# Patient Record
Sex: MALE | Race: WHITE | Employment: FULL TIME | URBAN - METROPOLITAN AREA
[De-identification: names, ages, dates, MRNs, and addresses within clinical notes are randomized per-mention and may not be internally consistent; named-entity substitution may affect disease eponyms.]

---

## 2017-05-17 ENCOUNTER — OFFICE VISIT (OUTPATIENT)
Dept: FAMILY MEDICINE | Facility: CLINIC | Age: 57
End: 2017-05-17
Payer: COMMERCIAL

## 2017-05-17 ENCOUNTER — TELEPHONE (OUTPATIENT)
Dept: FAMILY MEDICINE | Facility: CLINIC | Age: 57
End: 2017-05-17

## 2017-05-17 VITALS
TEMPERATURE: 98.8 F | WEIGHT: 226 LBS | OXYGEN SATURATION: 96 % | HEIGHT: 69 IN | HEART RATE: 83 BPM | BODY MASS INDEX: 33.47 KG/M2 | SYSTOLIC BLOOD PRESSURE: 196 MMHG | DIASTOLIC BLOOD PRESSURE: 108 MMHG

## 2017-05-17 DIAGNOSIS — H60.332 ACUTE SWIMMER'S EAR OF LEFT SIDE: ICD-10-CM

## 2017-05-17 DIAGNOSIS — Z13.6 CARDIOVASCULAR SCREENING; LDL GOAL LESS THAN 130: ICD-10-CM

## 2017-05-17 DIAGNOSIS — Z51.81 MEDICATION MONITORING ENCOUNTER: ICD-10-CM

## 2017-05-17 DIAGNOSIS — I10 HYPERTENSION GOAL BP (BLOOD PRESSURE) < 140/90: Primary | ICD-10-CM

## 2017-05-17 PROCEDURE — 99214 OFFICE O/P EST MOD 30 MIN: CPT | Performed by: FAMILY MEDICINE

## 2017-05-17 PROCEDURE — 82043 UR ALBUMIN QUANTITATIVE: CPT | Performed by: FAMILY MEDICINE

## 2017-05-17 PROCEDURE — 80048 BASIC METABOLIC PNL TOTAL CA: CPT | Performed by: FAMILY MEDICINE

## 2017-05-17 PROCEDURE — 36415 COLL VENOUS BLD VENIPUNCTURE: CPT | Performed by: FAMILY MEDICINE

## 2017-05-17 RX ORDER — NEOMYCIN SULFATE, POLYMYXIN B SULFATE AND HYDROCORTISONE 10; 3.5; 1 MG/ML; MG/ML; [USP'U]/ML
4 SUSPENSION/ DROPS AURICULAR (OTIC) 4 TIMES DAILY
Qty: 10 ML | Refills: 0 | Status: SHIPPED | OUTPATIENT
Start: 2017-05-17 | End: 2017-08-16

## 2017-05-17 RX ORDER — LOSARTAN POTASSIUM AND HYDROCHLOROTHIAZIDE 12.5; 5 MG/1; MG/1
1 TABLET ORAL DAILY
Qty: 30 TABLET | Refills: 1 | Status: SHIPPED | OUTPATIENT
Start: 2017-05-17 | End: 2017-07-17

## 2017-05-17 NOTE — TELEPHONE ENCOUNTER
Concern - high bP fluid running out of ears     Onset: sunday    Description:   207/118    Intensity: moderate    Progression of Symptoms:  worsening    Accompanying Signs & Symptoms:  Headache for 1 week no chest pain or SOB       Previous history of similar problem:   See note    Precipitating factors:   Worsened by: nothing    Alleviating factors:  Improved by: nothing        Therapies Tried and outcome: Message handled by Nurse Triage with [facility] - provider name: REID Downs         Appt made.        Advised to go to ER if chest pain and stroke symptoms that are reviewed with patients.    Patricia Ellis RN    [facility]

## 2017-05-17 NOTE — PROGRESS NOTES
SUBJECTIVE:                                                    Yannick Jean is a 56 year old male who presents to clinic today for the following health issues:    Hypertension Follow-up      Outpatient blood pressures was involved in a clinical study - He had his blood pressure checked at [facility] this morning.    Low Salt Diet: not monitoring salt     Hypertension -- The patient took his blood pressure this morning and it was 210/120. The patient has previously been prescribed lisinopril-hydrochlorothiazide in 2015, but he has not taken the medication. Lisinopril-hydrochlorothiazide caused him to have a persistent cough. He denies edema, chest pain, vision changes, and headache.    BP Readings from Last 3 Encounters:   05/17/17 (!) 196/108   03/30/16 (!) 160/99   10/28/15 139/82     Ear Problem -- The patient has been having bloody fluid leaking from both ears. He states that he occasionally swims in pools. He has a history of left ear tympanostomy.    Problem list and histories reviewed & adjusted, as indicated.  Additional history: as documented      Reviewed and updated as needed this visit by clinical staff  Tobacco  Allergies  Meds  Med Hx  Surg Hx  Fam Hx  Soc Hx      Reviewed and updated as needed this visit by Provider       Wt Readings from Last 4 Encounters:   05/17/17 226 lb (102.5 kg)   03/30/16 227 lb 9.6 oz (103.2 kg)   10/28/15 218 lb 6.4 oz (99.1 kg)   09/23/15 219 lb (99.3 kg)     Health Maintenance    Health Maintenance Due   Topic Date Due     MICROALBUMIN Q1 YEAR( NO INBASKET)  07/18/1959     FIT Q1 YR (NO INBASKET)  07/18/1968     ADVANCE DIRECTIVE PLANNING Q5 YRS (NO INBASKET)  07/18/1976     HEPATITIS C SCREENING  07/18/1976     LIPID MONITORING Q1 YEAR( NO INBASKET)  06/16/2010     PSA Q1 YR  04/01/2015     BMP Q1 YR (NO INBASKET)  09/23/2016       Current Problem List    Patient Active Problem List   Diagnosis     ED (erectile dysfunction)     Hyperplastic colonic polyp      Hypertension goal BP (blood pressure) < 140/90     CARDIOVASCULAR SCREENING; LDL GOAL LESS THAN 130     Cervical pain     S/P cervical spinal fusion       Past Medical History    Past Medical History:   Diagnosis Date     CARDIOVASCULAR SCREENING; LDL GOAL LESS THAN 130      Cervical stenosis of spine 5/15    central and foraminal - moderate to severe     Colon polyps, hyperplastic 6/09    x 4 - due 2019     ED (erectile dysfunction)     dr atkinson     Hypertension goal BP (blood pressure) < 140/90      Mixed hearing loss     Dr Pretty     Tobacco use disorder     quit 1/09       Past Surgical History    Past Surgical History:   Procedure Laterality Date     COLONOSCOPY  6/09    hyperplastic - due 10 yrs     FUSION CERVICAL ANTERIOR THREE+ LEVELS N/A 7/30/2015    C3-6 FUSION CERVICAL ANTERIOR - Dr Morris     PE TUBES      PE tubes x 5     STRESS ECHO (METRO)  1/08    normal     SURGICAL HISTORY OF -   12/08    Rt Ear cholesteoma/tympanoplasty dr pretty     SURGICAL HISTORY OF -   7/09    dr patton - large lt foot ganglion cyst     TONSILLECTOMY & ADENOIDECTOMY  1963,1989       Current Medications    Current Outpatient Prescriptions   Medication Sig Dispense Refill     losartan-hydrochlorothiazide (HYZAAR) 50-12.5 MG per tablet Take 1 tablet by mouth daily 30 tablet 1     neomycin-polymyxin-hydrocortisone (CORTISPORIN) 3.5-07634-3 otic suspension Place 4 drops in ear(s) 4 times daily 10 mL 0       Allergies    Allergies   Allergen Reactions     Penicillins        Immunizations    Immunization History   Administered Date(s) Administered     Influenza (H1N1) 01/12/2010     Influenza (IIV3) 11/05/2008, 11/03/2009, 12/10/2010     TD (ADULT, 7+) 01/01/2005     TDAP Vaccine (Boostrix) 07/29/2015       Family History    Family History   Problem Relation Age of Onset     Cardiovascular Father      First MI at 61, dies of MI at age 65     Respiratory Mother      Emphysema, COPD     Cardiovascular Maternal Grandmother       " age approx 50 of MI       Social History    Social History     Social History     Marital status:      Spouse name: aguila     Number of children: 2     Years of education: 12     Occupational History      None      Social History Main Topics     Smoking status: Former Smoker     Packs/day: 2.00     Years: 32.00     Types: Cigarettes     Quit date: 2009     Smokeless tobacco: Never Used     Alcohol use No     Drug use: No     Sexual activity: Yes     Partners: Female     Other Topics Concern     Caffeine Concern Yes     1-2 cups qd, 5 cans daily     Exercise No     Seat Belt No     Social History Narrative       All above reviewed and updated, all stable unless otherwise noted    Recent labs reviewed    ROS:  Constitutional, HEENT, cardiovascular, pulmonary, GI, , musculoskeletal, neuro, skin, endocrine and psych systems are negative, except as otherwise noted.    OBJECTIVE:                                                    BP (!) 196/108  Pulse 83  Temp 98.8  F (37.1  C) (Oral)  Ht 5' 9\" (1.753 m)  Wt 226 lb (102.5 kg)  SpO2 96%  BMI 33.37 kg/m2  Body mass index is 33.37 kg/(m^2).  GENERAL: healthy, alert and no distress  EYES: Eyes grossly normal to inspection, extraocular movements - intact, and PERRL  HENT: ear canals- normal; TMs- normal; Nose- normal; Mouth- no ulcers, no lesions  NECK: no tenderness, no adenopathy, no asymmetry, no masses, no stiffness; thyroid- normal to palpation  RESP: lungs clear to auscultation - no rales, no rhonchi, no wheezes  CV: regular rates and rhythm, normal S1 S2, no S3 or S4 and no murmur, no click or rub -  ABDOMEN: soft, no tenderness, no  hepatosplenomegaly, no masses, normal bowel sounds  MS: extremities- no gross deformities noted, no edema  SKIN: no suspicious lesions, no rashes  NEURO: strength and tone- normal, sensory exam- grossly normal, mentation- intact, speech- normal, reflexes- symmetric  BACK: no CVA tenderness, no paralumbar " tenderness  PSYCH: Alert and oriented times 3; speech- coherent , normal rate and volume; able to articulate logical thoughts, able to abstract reason, no tangential thoughts, no hallucinations or delusions, affect- normal  LYMPHATICS: ant. cervical- normal, post. cervical- normal, axillary- normal, supraclavicular- normal, inguinal- normal    DIAGNOSTICS/PROCEDURES:                                                      Pending     ASSESSMENT/PLAN:                                                        ICD-10-CM    1. Hypertension goal BP (blood pressure) < 140/90 I10 losartan-hydrochlorothiazide (HYZAAR) 50-12.5 MG per tablet   2. CARDIOVASCULAR SCREENING; LDL GOAL LESS THAN 130 Z13.6    3. Acute swimmer's ear of left side H60.332 neomycin-polymyxin-hydrocortisone (CORTISPORIN) 3.5-96061-2 otic suspension   4. Medication monitoring encounter Z51.81 Basic metabolic panel  (Ca, Cl, CO2, Creat, Gluc, K, Na, BUN)     Albumin Random Urine Quantitative     Hypertension - Start taking 12.5 MG losartan-hydrochlorothiazide once daily, labs. Followup in one week for blood pressure recheck.    Acute Swimmer's Ear - Place 4 drops of cortisporin four times daily    Discussed treatment/modality options, including risk and benefits, he desires new medication(s). All diagnosis above reviewed and noted above, otherwise stable.  See [facility] orders for further details.  Follow up in 7 day(s) and as needed.    Health Maintenance Due   Topic Date Due     MICROALBUMIN Q1 YEAR( NO INBASKET)  07/18/1959     FIT Q1 YR (NO INBASKET)  07/18/1968     ADVANCE DIRECTIVE PLANNING Q5 YRS (NO INBASKET)  07/18/1976     HEPATITIS C SCREENING  07/18/1976     LIPID MONITORING Q1 YEAR( NO INBASKET)  06/16/2010     PSA Q1 YR  04/01/2015     BMP Q1 YR (NO INBASKET)  09/23/2016       Follow up with Provider - 1 week for blood pressure recheck     This document serves as a record of the services and decisions personally performed and made by Maxwell aBck  MD. It was created on his behalf by Ro Hatch, a trained medical scribe. The creation of this document is based on the provider's statements to the medical scribe.  oR Hatch 2:42 PM 5/17/2017         Maxwell Back MD [address]  [address] [phone] Fax

## 2017-05-17 NOTE — MR AVS SNAPSHOT
After Visit Summary   5/17/2017    Yannick Jean    MRN: [id number]           Patient Information     Date Of Birth          1960        Visit Information        Provider Department      5/17/2017 2:20 PM Maxwell Back MD [facility]        Today's Diagnoses     Hypertension goal BP (blood pressure) < 140/90    -  1    CARDIOVASCULAR SCREENING; LDL GOAL LESS THAN 130        Acute swimmer's ear of left side        Medication monitoring encounter          Care Instructions    Hypertension - Start taking 12.5 MG losartan-hydrochlorothiazide once daily. Followup in one week for blood pressure recheck.    [facility]                        To reach your care team during and after hours:   [phone]  To reach our pharmacy:        [phone]    Clinic Hours                        Our clinic hours are:    Monday   7:30 am to 7:00 pm                  Tuesday through Friday 7:30 am to 5:00 pm                             Saturday   8:00 am to 12:00 pm      Sunday   Closed      Pharmacy Hours                        Our pharmacy hours are:    Monday   8:30 am to 7:00 pm       Tuesday to Friday  8:30 am to 6:00 pm                       Saturday    9:00 am to 1:00 pm              Sunday    Closed              There is also information available at our web site:  [email or web address]    If your provider ordered any lab tests and you do not receive the results within 10 business days, please call the clinic.    If you need a medication refill please contact your pharmacy.  Please allow 2-3 business days for your refill to be completed.    Our clinic offers telephone visits and e visits.  Please ask one of your team members to explain more.      Use [employer] (secure email communication and access to your chart) to send your primary care provider a message or make an appointment. Ask someone on your Team how to sign up for [employer].  Immunizations                   "    Immunization History   Administered Date(s) Administered     Influenza (H1N1) 01/12/2010     Influenza (IIV3) 11/05/2008, 11/03/2009, 12/10/2010     TD (ADULT, 7+) 01/01/2005     TDAP Vaccine (Boostrix) 07/29/2015        Health Maintenance                         Health Maintenance Due   Topic Date Due     Microalbumin Lab - yearly  07/18/1959     Colon Cancer Screening - FIT Test - yearly  07/18/1968     Discuss Advance Directive Planning  07/18/1976     Hepatitis C Screening  07/18/1976     Cholesterol Lab - yearly  06/16/2010     Prostate Test (PSA) - yearly  04/01/2015     Basic Metabolic Lab - yearly  09/23/2016             Follow-ups after your visit        Who to contact     If you have questions or need follow up information about today's clinic visit or your schedule please contact [facility] directly at [phone].  Normal or non-critical lab and imaging results will be communicated to you by MyChart, letter or phone within 4 business days after the clinic has received the results. If you do not hear from us within 7 days, please contact the clinic through [email or web address] or phone. If you have a critical or abnormal lab result, we will notify you by phone as soon as possible.  Submit refill requests through [employer] or call your pharmacy and they will forward the refill request to us. Please allow 3 business days for your refill to be completed.          Additional Information About Your Visit        MyChart Information     [employer] lets you send messages to your doctor, view your test results, renew your prescriptions, schedule appointments and more. To sign up, go to [email or web address] . Click on \"Log in\" on the left side of the screen, which will take you to the Welcome page. Then click on \"Sign up Now\" on the right side of the page.     You will be asked to enter the access code listed below, as well as some personal information. Please follow the directions to create your username " "and password.     Your access code is: [email or web address]  Expires: 8/15/2017  3:03 PM     Your access code will  in 90 days. If you need help or a new code, please call your [facility] clinic or [phone].        Care EveryWhere ID     This is your Care EveryWhere ID. This could be used by other organizations to access your [facility] medical records  [id number]        Your Vitals Were     Pulse Temperature Height Pulse Oximetry BMI (Body Mass Index)       83 98.8  F (37.1  C) (Oral) 5' 9\" (1.753 m) 96% 33.37 kg/m2        Blood Pressure from Last 3 Encounters:   17 (!) 196/108   16 (!) 160/99   10/28/15 139/82    Weight from Last 3 Encounters:   17 226 lb (102.5 kg)   16 227 lb 9.6 oz (103.2 kg)   10/28/15 218 lb 6.4 oz (99.1 kg)              We Performed the Following     Albumin Random Urine Quantitative     Basic metabolic panel  (Ca, Cl, CO2, Creat, Gluc, K, Na, BUN)          Today's Medication Changes          These changes are accurate as of: 17  3:03 PM.  If you have any questions, ask your nurse or doctor.               Start taking these medicines.        Dose/Directions    losartan-hydrochlorothiazide 50-12.5 MG per tablet   Commonly known as:  HYZAAR   Used for:  Hypertension goal BP (blood pressure) < 140/90   Started by:  Mxawell Back MD        Dose:  1 tablet   Take 1 tablet by mouth daily   Quantity:  30 tablet   Refills:  1       neomycin-polymyxin-hydrocortisone 3.5-83879-7 otic suspension   Commonly known as:  CORTISPORIN   Used for:  Acute swimmer's ear of left side   Started by:  Maxwell Back MD        Dose:  4 drop   Place 4 drops in ear(s) 4 times daily   Quantity:  10 mL   Refills:  0            Where to get your medicines      These medications were sent to [facility] [address] [facility]  [address] [place] [address]     Phone:  [phone]     losartan-hydrochlorothiazide 50-12.5 MG per tablet    " neomycin-polymyxin-hydrocortisone 3.5-47333-2 otic suspension                Primary Care Provider Office Phone # Fax #    Maxwell Back [phone]       [facility] [address] [place] [address]        Thank you!     Thank you for choosing [facility]  for your care. Our goal is always to provide you with excellent care. Hearing back from our patients is one way we can continue to improve our services. Please take a few minutes to complete the written survey that you may receive in the mail after your visit with us. Thank you!             Your Updated Medication List - Protect others around you: Learn how to safely use, store and throw away your medicines at www.disposemymeds.org.          This list is accurate as of: 5/17/17  3:03 PM.  Always use your most recent med list.                   Brand Name Dispense Instructions for use    losartan-hydrochlorothiazide 50-12.5 MG per tablet    HYZAAR    30 tablet    Take 1 tablet by mouth daily       neomycin-polymyxin-hydrocortisone 3.5-65339-0 otic suspension    CORTISPORIN    10 mL    Place 4 drops in ear(s) 4 times daily

## 2017-05-17 NOTE — NURSING NOTE
"Chief Complaint   Patient presents with     Hypertension       Initial BP (!) 196/108  Pulse 83  Temp 98.8  F (37.1  C) (Oral)  Ht 5' 9\" (1.753 m)  Wt 226 lb (102.5 kg)  SpO2 96%  BMI 33.37 kg/m2 Estimated body mass index is 33.37 kg/(m^2) as calculated from the following:    Height as of this encounter: 5' 9\" (1.753 m).    Weight as of this encounter: 226 lb (102.5 kg)..  BP completed using cuff size: brigette Mercedes MA  "

## 2017-05-18 LAB
ANION GAP SERPL CALCULATED.3IONS-SCNC: 8 MMOL/L (ref 3–14)
BUN SERPL-MCNC: 19 MG/DL (ref 7–30)
CALCIUM SERPL-MCNC: 8.5 MG/DL (ref 8.5–10.1)
CHLORIDE SERPL-SCNC: 108 MMOL/L (ref 94–109)
CO2 SERPL-SCNC: 26 MMOL/L (ref 20–32)
CREAT SERPL-MCNC: 1 MG/DL (ref 0.66–1.25)
CREAT UR-MCNC: 175 MG/DL
GFR SERPL CREATININE-BSD FRML MDRD: 77 ML/MIN/1.7M2
GLUCOSE SERPL-MCNC: 144 MG/DL (ref 70–99)
MICROALBUMIN UR-MCNC: 228 MG/L
MICROALBUMIN/CREAT UR: 130.29 MG/G CR (ref 0–17)
POTASSIUM SERPL-SCNC: 4.2 MMOL/L (ref 3.4–5.3)
SODIUM SERPL-SCNC: 142 MMOL/L (ref 133–144)

## 2017-05-24 ENCOUNTER — OFFICE VISIT (OUTPATIENT)
Dept: FAMILY MEDICINE | Facility: CLINIC | Age: 57
End: 2017-05-24
Payer: COMMERCIAL

## 2017-05-24 VITALS
DIASTOLIC BLOOD PRESSURE: 86 MMHG | SYSTOLIC BLOOD PRESSURE: 132 MMHG | WEIGHT: 223 LBS | BODY MASS INDEX: 33.03 KG/M2 | TEMPERATURE: 98.1 F | HEIGHT: 69 IN | OXYGEN SATURATION: 91 % | HEART RATE: 88 BPM

## 2017-05-24 DIAGNOSIS — Z51.81 MEDICATION MONITORING ENCOUNTER: ICD-10-CM

## 2017-05-24 DIAGNOSIS — R73.09 ELEVATED GLUCOSE: ICD-10-CM

## 2017-05-24 DIAGNOSIS — I10 HYPERTENSION GOAL BP (BLOOD PRESSURE) < 140/90: Primary | ICD-10-CM

## 2017-05-24 DIAGNOSIS — R80.9 PROTEINURIA: ICD-10-CM

## 2017-05-24 DIAGNOSIS — Z13.6 CARDIOVASCULAR SCREENING; LDL GOAL LESS THAN 130: ICD-10-CM

## 2017-05-24 LAB — HBA1C MFR BLD: 6.1 % (ref 4.3–6)

## 2017-05-24 PROCEDURE — 80048 BASIC METABOLIC PNL TOTAL CA: CPT | Performed by: FAMILY MEDICINE

## 2017-05-24 PROCEDURE — 36415 COLL VENOUS BLD VENIPUNCTURE: CPT | Performed by: FAMILY MEDICINE

## 2017-05-24 PROCEDURE — 99214 OFFICE O/P EST MOD 30 MIN: CPT | Performed by: FAMILY MEDICINE

## 2017-05-24 PROCEDURE — 83036 HEMOGLOBIN GLYCOSYLATED A1C: CPT | Performed by: FAMILY MEDICINE

## 2017-05-24 NOTE — PATIENT INSTRUCTIONS
Continue current medications    Follow up regarding lab tests today    Follow up for fasting labs at next visit    Follow up for recheck in 2-3 weeks    [facility]                        To reach your care team during and after hours:   [phone]  To reach our pharmacy:        [phone]    Clinic Hours                        Our clinic hours are:    Monday   7:30 am to 7:00 pm                  Tuesday through Friday 7:30 am to 5:00 pm                             Saturday   8:00 am to 12:00 pm      Sunday   Closed      Pharmacy Hours                        Our pharmacy hours are:    Monday   8:30 am to 7:00 pm       Tuesday to Friday  8:30 am to 6:00 pm                       Saturday    9:00 am to 1:00 pm              Sunday    Closed              There is also information available at our web site:  [email or web address]    If your provider ordered any lab tests and you do not receive the results within 10 business days, please call the clinic.    If you need a medication refill please contact your pharmacy.  Please allow 2-3 business days for your refill to be completed.    Our clinic offers telephone visits and e visits.  Please ask one of your team members to explain more.      Use [employer] (secure email communication and access to your chart) to send your primary care provider a message or make an appointment. Ask someone on your Team how to sign up for [employer].  Immunizations                      Immunization History   Administered Date(s) Administered     Influenza (H1N1) 01/12/2010     Influenza (IIV3) 11/05/2008, 11/03/2009, 12/10/2010     TD (ADULT, 7+) 01/01/2005     TDAP Vaccine (Boostrix) 07/29/2015        Health Maintenance                         Health Maintenance Due   Topic Date Due     Discuss Advance Directive Planning  07/18/1976     Hepatitis C Screening  07/18/1976     Cholesterol Lab - yearly  06/16/2010     Prostate Test (PSA) - yearly  04/01/2015

## 2017-05-24 NOTE — LETTER
[facility]  [address] [place] [address], [place] [address]                  [phone]   May 25, 2017    Yannick Jean  [address] [facility] [address] [facility] [address]      Dear Yannick,    Here is a summary of your recent test results:    Labs are overall quite good, glucose is normal, average glucose (A1c) is in the prediabetes range.     We advise:     Continue current cares.   Balanced low cholesterol diet.   Regular exercise.   Weight loss.   Follow up in the next few weeks as discussed.     Your test results are enclosed.      Please contact me if you have any questions.      Please call us at [phone] (or use [employer]) to address the above recommendations.            Thank you very much for trusting [facility]..     Healthy regards,        Maxwell Back M.D.        Results for orders placed or performed in visit on 05/24/17   Basic metabolic panel  (Ca, Cl, CO2, Creat, Gluc, K, Na, BUN)   Result Value Ref Range    Sodium 141 133 - 144 mmol/L    Potassium 4.5 3.4 - 5.3 mmol/L    Chloride 106 94 - 109 mmol/L    Carbon Dioxide 26 20 - 32 mmol/L    Anion Gap 9 3 - 14 mmol/L    Glucose 85 70 - 99 mg/dL    Urea Nitrogen 21 7 - 30 mg/dL    Creatinine 0.96 0.66 - 1.25 mg/dL    GFR Estimate 80 >60 mL/min/1.7m2    GFR Estimate If Black >90   GFR Calc   >60 mL/min/1.7m2    Calcium 9.3 8.5 - 10.1 mg/dL   Hemoglobin A1c   Result Value Ref Range    Hemoglobin A1C 6.1 (H) 4.3 - 6.0 %

## 2017-05-24 NOTE — PROGRESS NOTES
SUBJECTIVE:                                                    Yannick Jean is a 56 year old male who presents to clinic today for the following health issues:    Hypertension Follow-up      Outpatient blood pressures are being checked at work.  Results are 159/98 this morning was 171/111  Best was 144/97    Low Salt Diet: no added salt     Frankie was started on Losartan-HCTZ 50-12.5 mg on 5/17/17, since then his symptoms have improved and he has been taking outside BP's, with the lowest being 140's/80's. Denied any adverse chest pain, dizziness, etc. Proteinuria and elevated glucose noted.    Lab Results   Component Value Date    CR 1.00 05/17/2017     BP Readings from Last 3 Encounters:   05/24/17 132/86   05/17/17 (!) 196/108   03/30/16 (!) 160/99     Note 5/17/17 --   Hypertension -- The patient took his blood pressure this morning and it was 210/120. The patient has previously been prescribed lisinopril-hydrochlorothiazide in 2015, but he has not taken the medication. Lisinopril-hydrochlorothiazide caused him to have a persistent cough. He denies edema, chest pain, vision changes, and headache.       Problem list and histories reviewed & adjusted, as indicated.  Additional history: as documented    Reviewed and updated as needed this visit by clinical staff  Tobacco  Allergies  Meds  Med Hx  Surg Hx  Fam Hx  Soc Hx      Reviewed and updated as needed this visit by Provider         Wt Readings from Last 4 Encounters:   05/24/17 223 lb (101.2 kg)   05/17/17 226 lb (102.5 kg)   03/30/16 227 lb 9.6 oz (103.2 kg)   10/28/15 218 lb 6.4 oz (99.1 kg)       Health Maintenance    Health Maintenance Due   Topic Date Due     ADVANCE DIRECTIVE PLANNING Q5 YRS  07/18/1976     HEPATITIS C SCREENING  07/18/1976     LIPID MONITORING Q1 YEAR  06/16/2010     PSA Q1 YR  04/01/2015       Current Problem List    Patient Active Problem List   Diagnosis     ED (erectile dysfunction)     Hyperplastic colonic polyp      Hypertension goal BP (blood pressure) < 140/90     CARDIOVASCULAR SCREENING; LDL GOAL LESS THAN 130     Cervical pain     S/P cervical spinal fusion       Past Medical History    Past Medical History:   Diagnosis Date     CARDIOVASCULAR SCREENING; LDL GOAL LESS THAN 130      Cervical stenosis of spine 5/15    central and foraminal - moderate to severe     Colon polyps, hyperplastic 6/09    x 4 - due 2019     ED (erectile dysfunction)     dr atkinson     Hypertension goal BP (blood pressure) < 140/90      Mixed hearing loss     Dr Pretty     Tobacco use disorder     quit 1/09       Past Surgical History    Past Surgical History:   Procedure Laterality Date     COLONOSCOPY  6/09    hyperplastic - due 10 yrs     FUSION CERVICAL ANTERIOR THREE+ LEVELS N/A 7/30/2015    C3-6 FUSION CERVICAL ANTERIOR - Dr Morris     PE TUBES      PE tubes x 5     STRESS ECHO (METRO)  1/08    normal     SURGICAL HISTORY OF -   12/08    Rt Ear cholesteoma/tympanoplasty dr pretty     SURGICAL HISTORY OF -   7/09    dr patton - large lt foot ganglion cyst     TONSILLECTOMY & ADENOIDECTOMY  1963,1989       Current Medications    Current Outpatient Prescriptions   Medication Sig Dispense Refill     losartan-hydrochlorothiazide (HYZAAR) 50-12.5 MG per tablet Take 1 tablet by mouth daily 30 tablet 1     neomycin-polymyxin-hydrocortisone (CORTISPORIN) 3.5-82002-2 otic suspension Place 4 drops in ear(s) 4 times daily 10 mL 0       Allergies    Allergies   Allergen Reactions     Penicillins        Immunizations    Immunization History   Administered Date(s) Administered     Influenza (H1N1) 01/12/2010     Influenza (IIV3) 11/05/2008, 11/03/2009, 12/10/2010     TD (ADULT, 7+) 01/01/2005     TDAP Vaccine (Boostrix) 07/29/2015       Family History    Family History   Problem Relation Age of Onset     Cardiovascular Father      First MI at 61, dies of MI at age 65     Respiratory Mother      Emphysema, COPD     Cardiovascular Maternal Grandmother       " age approx 50 of MI       Social History    Social History     Social History     Marital status:      Spouse name: aguila     Number of children: 2     Years of education: 12     Occupational History      None      Social History Main Topics     Smoking status: Former Smoker     Packs/day: 2.00     Years: 32.00     Types: Cigarettes     Quit date: 2009     Smokeless tobacco: Never Used     Alcohol use No     Drug use: No     Sexual activity: Yes     Partners: Female     Other Topics Concern     Caffeine Concern Yes     1-2 cups qd, 5 cans daily     Exercise No     Seat Belt No     Social History Narrative       All above reviewed and updated, all stable unless otherwise noted    Recent labs reviewed    ROS:  Constitutional, HEENT, cardiovascular, pulmonary, GI, , musculoskeletal, neuro, skin, endocrine and psych systems are negative, except as in HPI or otherwise noted     This document serves as a record of the services and decisions personally performed and made by Maxwell Back MD formerly [facility]. It was created on their behalf by Branden Lopez, a trained medical scribe. The creation of this document is based the provider's statements to the medical scribe.  Branden Lopez May 24, 2017 2:39 PM      OBJECTIVE:                                                    /86 (BP Location: Right arm)  Pulse 88  Temp 98.1  F (36.7  C) (Oral)  Ht 5' 9\" (1.753 m)  Wt 223 lb (101.2 kg)  SpO2 91%  BMI 32.93 kg/m2  Body mass index is 32.93 kg/(m^2).  GENERAL: healthy, alert and no distress, obese  EYES: Eyes grossly normal to inspection, extraocular movements - intact, and PERRL  RESP: lungs clear to auscultation - no rales, no rhonchi, no wheezes  CV: regular rates and rhythm, normal S1 S2, no S3 or S4 and no murmur, no click or rub -  SKIN: no suspicious lesions, no rashes to visible skin  NEURO: mentation intact and speech normal  PSYCH: Alert and oriented times 3; speech- coherent , normal rate and volume; able to " articulate logical thoughts, able to abstract reason, no tangential thoughts, no hallucinations or delusions, affect- normal    DIAGNOSTICS/PROCEDURES:                                                      Results for orders placed or performed in visit on 05/17/17   Basic metabolic panel  (Ca, Cl, CO2, Creat, Gluc, K, Na, BUN)   Result Value Ref Range    Sodium 142 133 - 144 mmol/L    Potassium 4.2 3.4 - 5.3 mmol/L    Chloride 108 94 - 109 mmol/L    Carbon Dioxide 26 20 - 32 mmol/L    Anion Gap 8 3 - 14 mmol/L    Glucose 144 (H) 70 - 99 mg/dL    Urea Nitrogen 19 7 - 30 mg/dL    Creatinine 1.00 0.66 - 1.25 mg/dL    GFR Estimate 77 >60 mL/min/1.7m2    GFR Estimate If Black >90   GFR Calc   >60 mL/min/1.7m2    Calcium 8.5 8.5 - 10.1 mg/dL   Albumin Random Urine Quantitative   Result Value Ref Range    Creatinine Urine 175 mg/dL    Albumin Urine mg/L 228 mg/L    Albumin Urine mg/g Cr 130.29 (H) 0 - 17 mg/g Cr        ASSESSMENT/PLAN:                                                        ICD-10-CM    1. Hypertension goal BP (blood pressure) < 140/90 I10 Basic metabolic panel  (Ca, Cl, CO2, Creat, Gluc, K, Na, BUN)   2. Elevated glucose R73.09 Basic metabolic panel  (Ca, Cl, CO2, Creat, Gluc, K, Na, BUN)     Hemoglobin A1c   3. Proteinuria R80.9    4. CARDIOVASCULAR SCREENING; LDL GOAL LESS THAN 130 Z13.6    5. Medication monitoring encounter Z51.81 Basic metabolic panel  (Ca, Cl, CO2, Creat, Gluc, K, Na, BUN)     Discussed treatment/modality options, including risk and benefits, he desires low salt diet, regular exercise, weight loss, labs, continue current meds. All diagnosis above reviewed and noted above, otherwise stable.  See "[employer] orders for further details.  Follow up in 2-3 week(s) and as needed.    Health Maintenance Due   Topic Date Due     ADVANCE DIRECTIVE PLANNING Q5 YRS  07/18/1976     HEPATITIS C SCREENING  07/18/1976     LIPID MONITORING Q1 YEAR  06/16/2010     PSA Q1 YR  04/01/2015        Patient Instructions     Continue current medications    Follow up regarding lab tests today    Follow up for fasting labs at next visit    Follow up for recheck in 2-3 weeks      The information in this document, created by the medical scribe for me, accurately reflects the services I personally performed and the decisions made by me. I have reviewed and approved this document for accuracy.   Maxwell Back MD [facility]            Maxwell Back MD [address]  [id number] [phone] Fax

## 2017-05-24 NOTE — MR AVS SNAPSHOT
After Visit Summary   5/24/2017    Yannick Jean    MRN: [id number]           Patient Information     Date Of Birth          1960        Visit Information        Provider Department      5/24/2017 2:20 PM Maxwell Back MD [facility]        Today's Diagnoses     Hypertension goal BP (blood pressure) < 140/90    -  1    Elevated glucose        Proteinuria        CARDIOVASCULAR SCREENING; LDL GOAL LESS THAN 130        Medication monitoring encounter          Care Instructions    Continue current medications    Follow up regarding lab tests today    Follow up for fasting labs at next visit    Follow up for recheck in 2-3 weeks    [facility]                        To reach your care team during and after hours:   [phone]  To reach our pharmacy:        [phone]    Clinic Hours                        Our clinic hours are:    Monday   7:30 am to 7:00 pm                  Tuesday through Friday 7:30 am to 5:00 pm                             Saturday   8:00 am to 12:00 pm      Sunday   Closed      Pharmacy Hours                        Our pharmacy hours are:    Monday   8:30 am to 7:00 pm       Tuesday to Friday  8:30 am to 6:00 pm                       Saturday    9:00 am to 1:00 pm              Sunday    Closed              There is also information available at our web site:  [email or web address]    If your provider ordered any lab tests and you do not receive the results within 10 business days, please call the clinic.    If you need a medication refill please contact your pharmacy.  Please allow 2-3 business days for your refill to be completed.    Our clinic offers telephone visits and e visits.  Please ask one of your team members to explain more.      Use [employer] (secure email communication and access to your chart) to send your primary care provider a message or make an appointment. Ask someone on your Team how to sign up for [employer].  Immunizations                       Immunization History   Administered Date(s) Administered     Influenza (H1N1) 01/12/2010     Influenza (IIV3) 11/05/2008, 11/03/2009, 12/10/2010     TD (ADULT, 7+) 01/01/2005     TDAP Vaccine (Boostrix) 07/29/2015        Health Maintenance                         Health Maintenance Due   Topic Date Due     Discuss Advance Directive Planning  07/18/1976     Hepatitis C Screening  07/18/1976     Cholesterol Lab - yearly  06/16/2010     Prostate Test (PSA) - yearly  04/01/2015               Follow-ups after your visit        Your next 10 appointments already scheduled     Jun 21, 2017  2:40 PM CDT   Office Visit with Maxwell Back MD   [facility]    [address] [place] [id number] [phone]           Bring a current list of meds and any records pertaining to this visit.  For Physicals, please bring immunization records and any forms needing to be filled out.  Please arrive 10 minutes early to complete paperwork.              Who to contact     If you have questions or need follow up information about today's clinic visit or your schedule please contact [facility] directly at [phone].  Normal or non-critical lab and imaging results will be communicated to you by [employer], letter or phone within 4 business days after the clinic has received the results. If you do not hear from us within 7 days, please contact the clinic through [employer] or phone. If you have a critical or abnormal lab result, we will notify you by phone as soon as possible.  Submit refill requests through [employer] or call your pharmacy and they will forward the refill request to us. Please allow 3 business days for your refill to be completed.          Additional Information About Your Visit        [employer] Information     [employer] lets you send messages to your doctor, view your test results, renew your prescriptions, schedule appointments and more.  "To sign up, go to [email or web address] . Click on \"Log in\" on the left side of the screen, which will take you to the Welcome page. Then click on \"Sign up Now\" on the right side of the page.     You will be asked to enter the access code listed below, as well as some personal information. Please follow the directions to create your username and password.     Your access code is: [email or web address]  Expires: 8/15/2017  3:03 PM     Your access code will  in 90 days. If you need help or a new code, please call your [facility] clinic or [phone].        Care EveryWhere ID     This is your Care EveryWhere ID. This could be used by other organizations to access your [facility] medical records  [id number]        Your Vitals Were     Pulse Temperature Height Pulse Oximetry BMI (Body Mass Index)       88 98.1  F (36.7  C) (Oral) 5' 9\" (1.753 m) 91% 32.93 kg/m2        Blood Pressure from Last 3 Encounters:   17 132/86   17 (!) 196/108   16 (!) 160/99    Weight from Last 3 Encounters:   17 223 lb (101.2 kg)   17 226 lb (102.5 kg)   16 227 lb 9.6 oz (103.2 kg)              We Performed the Following     Basic metabolic panel  (Ca, Cl, CO2, Creat, Gluc, K, Na, BUN)     Hemoglobin A1c        Primary Care Provider Office Phone # Fax #    Maxwell Back [phone]       [facility] [address] [facility] [address]        Thank you!     Thank you for choosing [facility]  for your care. Our goal is always to provide you with excellent care. Hearing back from our patients is one way we can continue to improve our services. Please take a few minutes to complete the written survey that you may receive in the mail after your visit with us. Thank you!             Your Updated Medication List - Protect others around you: Learn how to safely use, store and throw away your medicines at www.disposemymeds.org.          This list is accurate as of: " 5/24/17  3:06 PM.  Always use your most recent med list.                   Brand Name Dispense Instructions for use    losartan-hydrochlorothiazide 50-12.5 MG per tablet    HYZAAR    30 tablet    Take 1 tablet by mouth daily       neomycin-polymyxin-hydrocortisone 3.5-62596-2 otic suspension    CORTISPORIN    10 mL    Place 4 drops in ear(s) 4 times daily

## 2017-05-25 LAB
ANION GAP SERPL CALCULATED.3IONS-SCNC: 9 MMOL/L (ref 3–14)
BUN SERPL-MCNC: 21 MG/DL (ref 7–30)
CALCIUM SERPL-MCNC: 9.3 MG/DL (ref 8.5–10.1)
CHLORIDE SERPL-SCNC: 106 MMOL/L (ref 94–109)
CO2 SERPL-SCNC: 26 MMOL/L (ref 20–32)
CREAT SERPL-MCNC: 0.96 MG/DL (ref 0.66–1.25)
GFR SERPL CREATININE-BSD FRML MDRD: 80 ML/MIN/1.7M2
GLUCOSE SERPL-MCNC: 85 MG/DL (ref 70–99)
POTASSIUM SERPL-SCNC: 4.5 MMOL/L (ref 3.4–5.3)
SODIUM SERPL-SCNC: 141 MMOL/L (ref 133–144)

## 2017-06-21 ENCOUNTER — OFFICE VISIT (OUTPATIENT)
Dept: FAMILY MEDICINE | Facility: CLINIC | Age: 57
End: 2017-06-21
Payer: COMMERCIAL

## 2017-06-21 VITALS
OXYGEN SATURATION: 95 % | WEIGHT: 227 LBS | HEIGHT: 69 IN | SYSTOLIC BLOOD PRESSURE: 128 MMHG | HEART RATE: 82 BPM | BODY MASS INDEX: 33.62 KG/M2 | DIASTOLIC BLOOD PRESSURE: 74 MMHG | TEMPERATURE: 98.4 F

## 2017-06-21 DIAGNOSIS — R73.09 ELEVATED GLUCOSE: ICD-10-CM

## 2017-06-21 DIAGNOSIS — I10 HYPERTENSION GOAL BP (BLOOD PRESSURE) < 140/90: Primary | ICD-10-CM

## 2017-06-21 DIAGNOSIS — Z71.89 ADVANCED DIRECTIVES, COUNSELING/DISCUSSION: ICD-10-CM

## 2017-06-21 DIAGNOSIS — Z51.81 MEDICATION MONITORING ENCOUNTER: ICD-10-CM

## 2017-06-21 DIAGNOSIS — N18.2 CKD (CHRONIC KIDNEY DISEASE) STAGE 2, GFR 60-89 ML/MIN: ICD-10-CM

## 2017-06-21 DIAGNOSIS — Z13.6 CARDIOVASCULAR SCREENING; LDL GOAL LESS THAN 130: ICD-10-CM

## 2017-06-21 PROCEDURE — 99214 OFFICE O/P EST MOD 30 MIN: CPT | Performed by: FAMILY MEDICINE

## 2017-06-21 NOTE — MR AVS SNAPSHOT
After Visit Summary   6/21/2017    Yannick Jean    MRN: [id number]           Patient Information     Date Of Birth          1960        Visit Information        Provider Department      6/21/2017 2:40 PM Maxwell Back MD [facility] [place]        Today's Diagnoses     Hypertension goal BP (blood pressure) < 140/90    -  1    CKD (chronic kidney disease) stage 2, GFR 60-89 ml/min        Elevated glucose        CARDIOVASCULAR SCREENING; LDL GOAL LESS THAN 130        Medication monitoring encounter        Advanced directives, counseling/discussion          Care Instructions    Follow up for fasting physical examination, when able this coming angeline - check with insurance prior regarding coverage    Recommended balanced, low-cholesterol diet -- along with gradual weight loss and regular exercise -- this will help with blood glucose levels    Recommended decreasing to one can of diet Mountain Dew a day, along with portion control for meals - food labels are a good resource      [facility]                        To reach your care team during and after hours:   [phone]  To reach our pharmacy:        [phone]    Clinic Hours                        Our clinic hours are:    Monday   7:30 am to 7:00 pm                  Tuesday through Friday 7:30 am to 5:00 pm                             Saturday   8:00 am to 12:00 pm      Sunday   Closed      Pharmacy Hours                        Our pharmacy hours are:    Monday   8:30 am to 7:00 pm       Tuesday to Friday  8:30 am to 6:00 pm                       Saturday    9:00 am to 1:00 pm              Sunday    Closed              There is also information available at our web site:  [email or web address]    If your provider ordered any lab tests and you do not receive the results within 10 business days, please call the clinic.    If you need a medication refill please contact your pharmacy.  Please allow 2-3 business days for  "your refill to be completed.    Our clinic offers telephone visits and e visits.  Please ask one of your team members to explain more.      Use [email or web address] (secure email communication and access to your chart) to send your primary care provider a message or make an appointment. Ask someone on your Team how to sign up for [employer].  Immunizations                      Immunization History   Administered Date(s) Administered     Influenza (H1N1) 01/12/2010     Influenza (IIV3) 11/05/2008, 11/03/2009, 12/10/2010     TD (ADULT, 7+) 01/01/2005     TDAP Vaccine (Boostrix) 07/29/2015        Health Maintenance                         Health Maintenance Due   Topic Date Due     Hepatitis C Screening  07/18/1976     Cholesterol Lab - yearly  06/16/2010     Prostate Test (PSA) - yearly  04/01/2015               Follow-ups after your visit        Who to contact     If you have questions or need follow up information about today's clinic visit or your schedule please contact [facility] directly at [phone].  Normal or non-critical lab and imaging results will be communicated to you by [email or web address], letter or phone within 4 business days after the clinic has received the results. If you do not hear from us within 7 days, please contact the clinic through [employer] or phone. If you have a critical or abnormal lab result, we will notify you by phone as soon as possible.  Submit refill requests through [employer] or call your pharmacy and they will forward the refill request to us. Please allow 3 business days for your refill to be completed.          Additional Information About Your Visit        [employer] Information     [employer] lets you send messages to your doctor, view your test results, renew your prescriptions, schedule appointments and more. To sign up, go to [email or web address] . Click on \"Log in\" on the left side of the screen, which will take you to the Welcome page. Then click on \"Sign up Now\" on the right side " "of the page.     You will be asked to enter the access code listed below, as well as some personal information. Please follow the directions to create your username and password.     Your access code is: [email or web address]  Expires: 8/15/2017  3:03 PM     Your access code will  in 90 days. If you need help or a new code, please call your [facility] clinic or [phone].        Care EveryWhere ID     This is your Care EveryWhere ID. This could be used by other organizations to access your [facility] medical records  [id number]        Your Vitals Were     Pulse Temperature Height Pulse Oximetry BMI (Body Mass Index)       82 98.4  F (36.9  C) (Oral) 5' 9\" (1.753 m) 95% 33.52 kg/m2        Blood Pressure from Last 3 Encounters:   17 128/74   17 132/86   17 (!) 196/108    Weight from Last 3 Encounters:   17 227 lb (103 kg)   17 223 lb (101.2 kg)   17 226 lb (102.5 kg)              Today, you had the following     No orders found for display       Primary Care Provider Office Phone # Fax #    Maxwell Back [phone]       [address] [address]        Equal Access to Services     KEVIN BOATENG : Hadii shelby ku hadasho Soomaali, waaxda luqadaha, qaybta kaalmada adejasmineyada, anthony vieira. So [facility] [phone].    ATENCIÓN: Si habla español, tiene a keys disposición servicios gratuitos de asistencia lingüística. Kedar al [phone].    We comply with applicable federal civil rights laws and [place] laws. We do not discriminate on the basis of race, color, national origin, age, disability sex, sexual orientation or gender identity.            Thank you!     Thank you for choosing [facility]  for your care. Our goal is always to provide you with excellent care. Hearing back from our patients is one way we can continue to improve our services. Please take a few minutes to complete the " written survey that you may receive in the mail after your visit with us. Thank you!             Your Updated Medication List - Protect others around you: Learn how to safely use, store and throw away your medicines at www.disposemymeds.org.          This list is accurate as of: 6/21/17  3:25 PM.  Always use your most recent med list.                   Brand Name Dispense Instructions for use Diagnosis    losartan-hydrochlorothiazide 50-12.5 MG per tablet    HYZAAR    30 tablet    Take 1 tablet by mouth daily    Hypertension goal BP (blood pressure) < 140/90       neomycin-polymyxin-hydrocortisone 3.5-73139-9 otic suspension    CORTISPORIN    10 mL    Place 4 drops in ear(s) 4 times daily    Acute swimmer's ear of left side

## 2017-06-21 NOTE — PROGRESS NOTES
"  SUBJECTIVE:                                                    Yannick Jean is a 56 year old male who presents to clinic today for the following health issues:    Hx of Elevated Glucose -- Drinking 2 cans of diet Mountain Dew a day.     Lab Results   Component Value Date    A1C 6.1 05/24/2017     Diabetic Glucose   Latest Ref Rng & Units 70 - 99 mg/dL   1/17/2008 104 (H)   12/8/2008 71   6/16/2009 103 (H)   7/10/2009 90   7/29/2015 93   8/24/2015 97   9/23/2015 115 (H)   5/17/2017 144 (H)   5/24/2017 85     CKD Stage 2 --     Creatinine   Date Value Ref Range Status   05/24/2017 0.96 0.66 - 1.25 mg/dL Final     GFR Estimate   Date Value Ref Range Status   05/24/2017 80 >60 mL/min/1.7m2 Final     Comment:     Non  GFR Calc   05/17/2017 77 >60 mL/min/1.7m2 Final     Comment:     Non  GFR Calc   09/23/2015 83 >60 mL/min/1.7m2 Final     Comment:     Non  GFR Calc   08/24/2015 79 >60 mL/min/1.7m2 Final     Comment:     Non  GFR Calc   07/30/2015 >90  Non  GFR Calc   >60 mL/min/1.7m2 Final     Hypertension Follow-up      Outpatient blood pressures are being checked at home.  Results are 135/85 today  158/90\"s yesterday.    Low Salt Diet: no added salt     Losartan-HCTZ 50-12.5 mg.     BP Readings from Last 3 Encounters:   06/21/17 128/74   05/24/17 132/86   05/17/17 (!) 196/108       Amount of exercise or physical activity: golf, motorcycling    Problems taking medications regularly: No    Medication side effects: none    Diet: regular (no restrictions)      5/24/17 Note --     Hypertension Follow-up    Outpatient blood pressures are being checked at work.  Results are 159/98 this morning was 171/111  Best was 144/97    Low Salt Diet: no added salt      Frankie was started on Losartan-HCTZ 50-12.5 mg on 5/17/17, since then his symptoms have improved and he has been taking outside BP's, with the lowest being 140's/80's. Denied any adverse " chest pain, dizziness, etc. Proteinuria and elevated glucose noted.      Problem list and histories reviewed & adjusted, as indicated.  Additional history: as documented    Reviewed and updated as needed this visit by clinical staff  Tobacco  Allergies  Meds  Med Hx  Surg Hx  Fam Hx  Soc Hx      Reviewed and updated as needed this visit by Provider       Wt Readings from Last 4 Encounters:   06/21/17 227 lb (103 kg)   05/24/17 223 lb (101.2 kg)   05/17/17 226 lb (102.5 kg)   03/30/16 227 lb 9.6 oz (103.2 kg)       Health Maintenance    Health Maintenance Due   Topic Date Due     HEPATITIS C SCREENING  07/18/1976     LIPID MONITORING Q1 YEAR  06/16/2010     PSA Q1 YR  04/01/2015       Current Problem List    Patient Active Problem List   Diagnosis     ED (erectile dysfunction)     Hyperplastic colonic polyp     Hypertension goal BP (blood pressure) < 140/90     CARDIOVASCULAR SCREENING; LDL GOAL LESS THAN 130     Cervical pain     S/P cervical spinal fusion     Advanced directives, counseling/discussion     CKD (chronic kidney disease) stage 2, GFR 60-89 ml/min       Past Medical History    Past Medical History:   Diagnosis Date     CARDIOVASCULAR SCREENING; LDL GOAL LESS THAN 130      Cervical stenosis of spine 5/15    central and foraminal - moderate to severe     CKD (chronic kidney disease) stage 2, GFR 60-89 ml/min 05/2017     Colon polyps, hyperplastic 6/09    x 4 - due 2019     ED (erectile dysfunction)     dr atkinson     Hypertension goal BP (blood pressure) < 140/90      Mixed hearing loss     Dr Lau     Prediabetes 05/2017     Tobacco use disorder     quit 1/09       Past Surgical History    Past Surgical History:   Procedure Laterality Date     COLONOSCOPY  6/09    hyperplastic - due 10 yrs     FUSION CERVICAL ANTERIOR THREE+ LEVELS N/A 7/30/2015    C3-6 FUSION CERVICAL ANTERIOR - Dr Morris     PE TUBES      PE tubes x 5     STRESS ECHO ([facility])  1/08    normal     SURGICAL HISTORY OF -   12/08     Rt Ear cholesteoma/tympanoplasty dr pretty     SURGICAL HISTORY OF -       dr patton - large lt foot ganglion cyst     TONSILLECTOMY & ADENOIDECTOMY  ,       Current Medications    Current Outpatient Prescriptions   Medication Sig Dispense Refill     losartan-hydrochlorothiazide (HYZAAR) 50-12.5 MG per tablet Take 1 tablet by mouth daily 30 tablet 1     neomycin-polymyxin-hydrocortisone (CORTISPORIN) 3.5-27678-8 otic suspension Place 4 drops in ear(s) 4 times daily 10 mL 0       Allergies    Allergies   Allergen Reactions     Penicillins        Immunizations    Immunization History   Administered Date(s) Administered     Influenza (H1N1) 2010     Influenza (IIV3) 2008, 2009, 12/10/2010     TD (ADULT, 7+) 2005     TDAP Vaccine (Boostrix) 2015       Family History    Family History   Problem Relation Age of Onset     Cardiovascular Father      First MI at 61, dies of MI at age 65     Respiratory Mother      Emphysema, COPD     Cardiovascular Maternal Grandmother       age approx 50 of MI       Social History    Social History     Social History     Marital status:      Spouse name: aguila     Number of children: 2     Years of education: 12     Occupational History      None      Social History Main Topics     Smoking status: Former Smoker     Packs/day: 2.00     Years: 32.00     Types: Cigarettes     Quit date: 2009     Smokeless tobacco: Never Used     Alcohol use No     Drug use: No     Sexual activity: Yes     Partners: Female     Other Topics Concern     Caffeine Concern Yes     1-2 cups qd, 5 cans daily     Exercise No     Seat Belt No     Social History Narrative     All above reviewed and updated, all stable unless otherwise noted    Recent labs reviewed    ROS:  Constitutional, HEENT, cardiovascular, pulmonary, GI, , musculoskeletal, neuro, skin, endocrine and psych systems are negative, except as in HPI or otherwise noted     This document serves  "as a record of the services and decisions personally performed and made by Maxwell Back MD [facility] with [facility]. It was created on their behalf by Branden Lopez, a trained medical scribe. The creation of this document is based the provider's statements to the medical scribe.  Branden Lopez June 21, 2017 3:07 PM    OBJECTIVE:                                                    /74  Pulse 82  Temp 98.4  F (36.9  C) (Oral)  Ht 5' 9\" (1.753 m)  Wt 227 lb (103 kg)  SpO2 95%  BMI 33.52 kg/m2  Body mass index is 33.52 kg/(m^2).  GENERAL: healthy, alert and no distress, obese  EYES: Eyes grossly normal to inspection, extraocular movements - intact, and PERRL  HENT: ear canals and TM's normal with bilateral impacted cerumen upon viewing with otoscope, nose and mouth without ulcers or lesions upon viewing with otoscope  RESP: lungs clear to auscultation - no rales, no rhonchi, no wheezes  CV: regular rates and rhythm, normal S1 S2, no S3 or S4 and no murmur, no click or rub -  ABDOMEN: soft, no tenderness, no  hepatosplenomegaly, no masses, normal bowel sounds  MS: extremities- no gross deformities noted, no edema  SKIN: no suspicious lesions, no rashes to visible skin  NEURO: mentation intact and speech normal  BACK: no CVA tenderness, no paralumbar tenderness  PSYCH: Alert and oriented times 3; speech- coherent , normal rate and volume; able to articulate logical thoughts, able to abstract reason, no tangential thoughts, no hallucinations or delusions, affect- normal    DIAGNOSTICS/PROCEDURES:                                                      Results for orders placed or performed in visit on 05/24/17   Basic metabolic panel  (Ca, Cl, CO2, Creat, Gluc, K, Na, BUN)   Result Value Ref Range    Sodium 141 133 - 144 mmol/L    Potassium 4.5 3.4 - 5.3 mmol/L    Chloride 106 94 - 109 mmol/L    Carbon Dioxide 26 20 - 32 mmol/L    Anion Gap 9 3 - 14 mmol/L    Glucose 85 70 - 99 mg/dL    Urea Nitrogen 21 7 - 30 mg/dL    Creatinine 0.96 0.66 " - 1.25 mg/dL    GFR Estimate 80 >60 mL/min/1.7m2    GFR Estimate If Black >90   GFR Calc   >60 mL/min/1.7m2    Calcium 9.3 8.5 - 10.1 mg/dL   Hemoglobin A1c   Result Value Ref Range    Hemoglobin A1C 6.1 (H) 4.3 - 6.0 %        ASSESSMENT/PLAN:                                                        ICD-10-CM    1. Hypertension goal BP (blood pressure) < 140/90 I10    2. CKD (chronic kidney disease) stage 2, GFR 60-89 ml/min N18.2    3. Elevated glucose R73.09    4. CARDIOVASCULAR SCREENING; LDL GOAL LESS THAN 130 Z13.6    5. Medication monitoring encounter Z51.81    6. Advanced directives, counseling/discussion Z71.89      Discussed treatment/modality options, including risk and benefits, he desires advised diet, exercise, and weight loss, diet discussed, follow up for fasting complete physical, further health care maintenance, medication refill(s), OTC meds and observation. All diagnosis above reviewed and noted above, otherwise stable.  See [employer] orders for further details.  Follow up in 3-4 month(s) and as needed.    Health Maintenance Due   Topic Date Due     HEPATITIS C SCREENING  07/18/1976     LIPID MONITORING Q1 YEAR  06/16/2010     PSA Q1 YR  04/01/2015     Patient Instructions     Follow up for fasting physical examination, when able this coming angeline - check with insurance prior regarding coverage    Recommended balanced, low-cholesterol diet -- along with gradual weight loss and regular exercise -- this will help with blood glucose levels    Recommended decreasing to one can of diet Mountain Dew a day, along with portion control for meals - food labels are a good resource    The information in this document, created by the medical scribe for me, accurately reflects the services I personally performed and the decisions made by me. I have reviewed and approved this document for accuracy.   Maxwell Back MD FAAFP            Maxwell Back MD [facility] [place]    [address]  [place]  [address]  [phone] Fax

## 2017-06-21 NOTE — PATIENT INSTRUCTIONS
Follow up for fasting physical examination, when able this coming autumn - check with insurance prior regarding coverage    Recommended balanced, low-cholesterol diet -- along with gradual weight loss and regular exercise -- this will help with blood glucose levels    Recommended decreasing to one can of diet Mountain Dew a day, along with portion control for meals - food labels are a good resource      [facility]  [place]                        To reach your care team during and after hours:   [phone]  To reach our pharmacy:        [phone]    Clinic Hours                        Our clinic hours are:    Monday   7:30 am to 7:00 pm                  Tuesday through Friday 7:30 am to 5:00 pm                             Saturday   8:00 am to 12:00 pm      Sunday   Closed      Pharmacy Hours                        Our pharmacy hours are:    Monday   8:30 am to 7:00 pm       Tuesday to Friday  8:30 am to 6:00 pm                       Saturday    9:00 am to 1:00 pm              Sunday    Closed              There is also information available at our web site:  [email or web address]    If your provider ordered any lab tests and you do not receive the results within 10 business days, please call the clinic.    If you need a medication refill please contact your pharmacy.  Please allow 2-3 business days for your refill to be completed.    Our clinic offers telephone visits and e visits.  Please ask one of your team members to explain more.      Use [employer] (secure email communication and access to your chart) to send your primary care provider a message or make an appointment. Ask someone on your Team how to sign up for [employer].  Immunizations                      Immunization History   Administered Date(s) Administered     Influenza (H1N1) 01/12/2010     Influenza (IIV3) 11/05/2008, 11/03/2009, 12/10/2010     TD (ADULT, 7+) 01/01/2005     TDAP Vaccine (Boostrix) 07/29/2015        Health Maintenance                          Health Maintenance Due   Topic Date Due     Hepatitis C Screening  07/18/1978     Cholesterol Lab - yearly  06/16/2010     Prostate Test (PSA) - yearly  04/01/2015

## 2017-06-21 NOTE — NURSING NOTE
"Chief Complaint   Patient presents with     Hypertension       Initial /74  Pulse 82  Temp 98.4  F (36.9  C) (Oral)  Ht 5' 9\" (1.753 m)  Wt 227 lb (103 kg)  SpO2 95%  BMI 33.52 kg/m2 Estimated body mass index is 33.52 kg/(m^2) as calculated from the following:    Height as of this encounter: 5' 9\" (1.753 m).    Weight as of this encounter: 227 lb (103 kg)..  BP completed using cuff size: brigette Mercedes MA  "

## 2017-07-17 DIAGNOSIS — I10 HYPERTENSION GOAL BP (BLOOD PRESSURE) < 140/90: ICD-10-CM

## 2017-07-18 NOTE — TELEPHONE ENCOUNTER
losartan-hydrochlorothiazide (HYZAAR) 50-12.5 MG per tablet      Last Written Prescription Date: 5/17/2017  Last Fill Quantity: 30 tablet, # refills: 1  Last Office Visit with [facility], [facility] or [facility] prescribing provider: 6/21/2017       Potassium   Date Value Ref Range Status   05/24/2017 4.5 3.4 - 5.3 mmol/L Final     Creatinine   Date Value Ref Range Status   05/24/2017 0.96 0.66 - 1.25 mg/dL Final     BP Readings from Last 3 Encounters:   06/21/17 128/74   05/24/17 132/86   05/17/17 (!) 196/108

## 2017-07-19 RX ORDER — LOSARTAN POTASSIUM AND HYDROCHLOROTHIAZIDE 12.5; 5 MG/1; MG/1
TABLET ORAL
Qty: 90 TABLET | Refills: 1 | Status: SHIPPED | OUTPATIENT
Start: 2017-07-19 | End: 2018-01-29

## 2017-07-19 NOTE — TELEPHONE ENCOUNTER
Prescription approved per [facility], [facility] or [facility] refill protocol.  Mitra Johnston RN - Triage  [facility]

## 2017-08-16 ENCOUNTER — TELEPHONE (OUTPATIENT)
Dept: FAMILY MEDICINE | Facility: CLINIC | Age: 57
End: 2017-08-16

## 2017-08-16 ENCOUNTER — APPOINTMENT (OUTPATIENT)
Dept: MRI IMAGING | Facility: CLINIC | Age: 57
End: 2017-08-16
Attending: INTERNAL MEDICINE
Payer: COMMERCIAL

## 2017-08-16 ENCOUNTER — HOSPITAL ENCOUNTER (OUTPATIENT)
Facility: CLINIC | Age: 57
Setting detail: OBSERVATION
Discharge: HOME | End: 2017-08-17
Attending: EMERGENCY MEDICINE | Admitting: INTERNAL MEDICINE
Payer: COMMERCIAL

## 2017-08-16 ENCOUNTER — APPOINTMENT (OUTPATIENT)
Dept: CT IMAGING | Facility: CLINIC | Age: 57
End: 2017-08-16
Attending: EMERGENCY MEDICINE
Payer: COMMERCIAL

## 2017-08-16 DIAGNOSIS — I10 HYPERTENSION GOAL BP (BLOOD PRESSURE) < 140/90: ICD-10-CM

## 2017-08-16 DIAGNOSIS — R29.810 FACIAL DROOP: ICD-10-CM

## 2017-08-16 DIAGNOSIS — G51.0 BELL'S PALSY: Primary | ICD-10-CM

## 2017-08-16 DIAGNOSIS — H02.401 PTOSIS, RIGHT: ICD-10-CM

## 2017-08-16 LAB
ANION GAP SERPL CALCULATED.3IONS-SCNC: 8 MMOL/L (ref 3–14)
APTT PPP: 36 SEC (ref 22–37)
BASOPHILS # BLD AUTO: 0.1 10E9/L (ref 0–0.2)
BASOPHILS NFR BLD AUTO: 0.9 %
BUN SERPL-MCNC: 17 MG/DL (ref 7–30)
CALCIUM SERPL-MCNC: 8.3 MG/DL (ref 8.5–10.1)
CHLORIDE SERPL-SCNC: 105 MMOL/L (ref 94–109)
CO2 SERPL-SCNC: 26 MMOL/L (ref 20–32)
CREAT BLD-MCNC: 1 MG/DL (ref 0.66–1.25)
CREAT SERPL-MCNC: 0.93 MG/DL (ref 0.66–1.25)
DIFFERENTIAL METHOD BLD: NORMAL
EOSINOPHIL # BLD AUTO: 0.3 10E9/L (ref 0–0.7)
EOSINOPHIL NFR BLD AUTO: 3.3 %
ERYTHROCYTE [DISTWIDTH] IN BLOOD BY AUTOMATED COUNT: 13.1 % (ref 10–15)
GFR SERPL CREATININE-BSD FRML MDRD: 76 ML/MIN/1.7M2
GFR SERPL CREATININE-BSD FRML MDRD: 83 ML/MIN/1.7M2
GLUCOSE BLDC GLUCOMTR-MCNC: 178 MG/DL (ref 70–99)
GLUCOSE SERPL-MCNC: 179 MG/DL (ref 70–99)
HCT VFR BLD AUTO: 48.1 % (ref 40–53)
HGB BLD-MCNC: 16.5 G/DL (ref 13.3–17.7)
IMM GRANULOCYTES # BLD: 0 10E9/L (ref 0–0.4)
IMM GRANULOCYTES NFR BLD: 0.2 %
INR PPP: 0.92 (ref 0.86–1.14)
LYMPHOCYTES # BLD AUTO: 2.4 10E9/L (ref 0.8–5.3)
LYMPHOCYTES NFR BLD AUTO: 29 %
MCH RBC QN AUTO: 29.9 PG (ref 26.5–33)
MCHC RBC AUTO-ENTMCNC: 34.3 G/DL (ref 31.5–36.5)
MCV RBC AUTO: 87 FL (ref 78–100)
MONOCYTES # BLD AUTO: 0.5 10E9/L (ref 0–1.3)
MONOCYTES NFR BLD AUTO: 5.9 %
NEUTROPHILS # BLD AUTO: 5 10E9/L (ref 1.6–8.3)
NEUTROPHILS NFR BLD AUTO: 60.7 %
NRBC # BLD AUTO: 0 10*3/UL
NRBC BLD AUTO-RTO: 0 /100
PLATELET # BLD AUTO: 276 10E9/L (ref 150–450)
POTASSIUM SERPL-SCNC: 3.8 MMOL/L (ref 3.4–5.3)
RBC # BLD AUTO: 5.52 10E12/L (ref 4.4–5.9)
SODIUM SERPL-SCNC: 139 MMOL/L (ref 133–144)
WBC # BLD AUTO: 8.2 10E9/L (ref 4–11)

## 2017-08-16 PROCEDURE — G0378 HOSPITAL OBSERVATION PER HR: HCPCS

## 2017-08-16 PROCEDURE — 85730 THROMBOPLASTIN TIME PARTIAL: CPT | Performed by: EMERGENCY MEDICINE

## 2017-08-16 PROCEDURE — 82565 ASSAY OF CREATININE: CPT

## 2017-08-16 PROCEDURE — 70544 MR ANGIOGRAPHY HEAD W/O DYE: CPT | Mod: XS

## 2017-08-16 PROCEDURE — 99220 ZZC INITIAL OBSERVATION CARE,LEVL III: CPT | Performed by: INTERNAL MEDICINE

## 2017-08-16 PROCEDURE — 85025 COMPLETE CBC W/AUTO DIFF WBC: CPT | Performed by: EMERGENCY MEDICINE

## 2017-08-16 PROCEDURE — 99285 EMERGENCY DEPT VISIT HI MDM: CPT | Mod: 25

## 2017-08-16 PROCEDURE — 25000128 H RX IP 250 OP 636: Performed by: INTERNAL MEDICINE

## 2017-08-16 PROCEDURE — 70553 MRI BRAIN STEM W/O & W/DYE: CPT

## 2017-08-16 PROCEDURE — 80048 BASIC METABOLIC PNL TOTAL CA: CPT | Performed by: EMERGENCY MEDICINE

## 2017-08-16 PROCEDURE — A9585 GADOBUTROL INJECTION: HCPCS | Performed by: INTERNAL MEDICINE

## 2017-08-16 PROCEDURE — 85610 PROTHROMBIN TIME: CPT | Performed by: EMERGENCY MEDICINE

## 2017-08-16 PROCEDURE — 93005 ELECTROCARDIOGRAM TRACING: CPT

## 2017-08-16 PROCEDURE — 70450 CT HEAD/BRAIN W/O DYE: CPT

## 2017-08-16 PROCEDURE — 00000146 ZZHCL STATISTIC GLUCOSE BY METER IP

## 2017-08-16 PROCEDURE — 25000132 ZZH RX MED GY IP 250 OP 250 PS 637: Performed by: EMERGENCY MEDICINE

## 2017-08-16 RX ORDER — HYDRALAZINE HYDROCHLORIDE 20 MG/ML
10 INJECTION INTRAMUSCULAR; INTRAVENOUS EVERY 4 HOURS PRN
Status: DISCONTINUED | OUTPATIENT
Start: 2017-08-16 | End: 2017-08-16

## 2017-08-16 RX ORDER — ACETAMINOPHEN 325 MG/1
650 TABLET ORAL EVERY 4 HOURS PRN
Status: DISCONTINUED | OUTPATIENT
Start: 2017-08-16 | End: 2017-08-17 | Stop reason: HOSPADM

## 2017-08-16 RX ORDER — SODIUM CHLORIDE 9 MG/ML
INJECTION, SOLUTION INTRAVENOUS CONTINUOUS
Status: DISCONTINUED | OUTPATIENT
Start: 2017-08-16 | End: 2017-08-16

## 2017-08-16 RX ORDER — ONDANSETRON 2 MG/ML
4 INJECTION INTRAMUSCULAR; INTRAVENOUS EVERY 6 HOURS PRN
Status: DISCONTINUED | OUTPATIENT
Start: 2017-08-16 | End: 2017-08-17 | Stop reason: HOSPADM

## 2017-08-16 RX ORDER — ASPIRIN 325 MG
325 TABLET ORAL ONCE
Status: COMPLETED | OUTPATIENT
Start: 2017-08-16 | End: 2017-08-16

## 2017-08-16 RX ORDER — HYDRALAZINE HYDROCHLORIDE 20 MG/ML
20 INJECTION INTRAMUSCULAR; INTRAVENOUS EVERY 4 HOURS PRN
Status: DISCONTINUED | OUTPATIENT
Start: 2017-08-16 | End: 2017-08-17 | Stop reason: HOSPADM

## 2017-08-16 RX ORDER — NICOTINE POLACRILEX 4 MG
15-30 LOZENGE BUCCAL
Status: DISCONTINUED | OUTPATIENT
Start: 2017-08-16 | End: 2017-08-17 | Stop reason: HOSPADM

## 2017-08-16 RX ORDER — DEXTROSE MONOHYDRATE 25 G/50ML
25-50 INJECTION, SOLUTION INTRAVENOUS
Status: DISCONTINUED | OUTPATIENT
Start: 2017-08-16 | End: 2017-08-17 | Stop reason: HOSPADM

## 2017-08-16 RX ORDER — NALOXONE HYDROCHLORIDE 0.4 MG/ML
.1-.4 INJECTION, SOLUTION INTRAMUSCULAR; INTRAVENOUS; SUBCUTANEOUS
Status: DISCONTINUED | OUTPATIENT
Start: 2017-08-16 | End: 2017-08-17 | Stop reason: HOSPADM

## 2017-08-16 RX ORDER — AMLODIPINE BESYLATE 5 MG/1
5 TABLET ORAL ONCE
Status: COMPLETED | OUTPATIENT
Start: 2017-08-16 | End: 2017-08-17

## 2017-08-16 RX ORDER — GADOBUTROL 604.72 MG/ML
10 INJECTION INTRAVENOUS ONCE
Status: COMPLETED | OUTPATIENT
Start: 2017-08-16 | End: 2017-08-16

## 2017-08-16 RX ORDER — ONDANSETRON 4 MG/1
4 TABLET, ORALLY DISINTEGRATING ORAL EVERY 6 HOURS PRN
Status: DISCONTINUED | OUTPATIENT
Start: 2017-08-16 | End: 2017-08-17 | Stop reason: HOSPADM

## 2017-08-16 RX ADMIN — ASPIRIN 325 MG ORAL TABLET 325 MG: 325 PILL ORAL at 15:25

## 2017-08-16 RX ADMIN — SODIUM CHLORIDE: 9 INJECTION, SOLUTION INTRAVENOUS at 17:21

## 2017-08-16 RX ADMIN — GADOBUTROL 10 ML: 604.72 INJECTION INTRAVENOUS at 20:25

## 2017-08-16 ASSESSMENT — ENCOUNTER SYMPTOMS
PALPITATIONS: 0
SEIZURES: 0
HEADACHES: 1
CHILLS: 0
FACIAL SWELLING: 0
DIZZINESS: 0
WEAKNESS: 1
ARTHRALGIAS: 0
COUGH: 0
CONFUSION: 0
ABDOMINAL PAIN: 0
VOMITING: 0
SHORTNESS OF BREATH: 0
LIGHT-HEADEDNESS: 0
FACIAL ASYMMETRY: 1
NUMBNESS: 1
FEVER: 0
MYALGIAS: 0
TREMORS: 0
NAUSEA: 0

## 2017-08-16 NOTE — ED PROVIDER NOTES
"  History     Chief Complaint:  Facial Droop      HPI   Yannick Jean is a 57 year old male who presents to the emergency department today for evaluation of left sided facial droop. The patient noticed facial droop that started Sunday along with tingling sensation. The patient states that due to the facial weakness he is now unable to completely close his left eye. He also reports feeling that his right upper eyelid is \"drooping\" and is difficult to open. The symptoms have fluctuated in intensity since onset.  He states that his symptoms are worse today than on Sunday. He also states that his speech doesn't seem normal to him and that it is more \"mumbley\" than normal more him. He denies any weakness anywhere else, no falls or injuries, no recent illnesses, and no ear pain. He denies changes in his hearing. He denies vision changes, floaters. He reports mild bifrontal headache. No n/v. Denies sensation changes in his body/extremities. Denies dizziness, gait instability, difficulty walking. No prior hx of stroke or similar symptoms. Denies fever, neck pain.    Allergies:  Penicillins    Medications:    losartan-hydrochlorothiazide (HYZAAR) 50-12.5 MG per tablet   neomycin-polymyxin-hydrocortisone (CORTISPORIN) 3.5-83755-6 otic suspension     Past Medical History:    Cardiovascular screening  Cervical stenosis of spine  Chronic kidney disease  Colon polyps  Erectile dysfunction  Hypertension  mixed hearing loss  Prediabetes  Tobacco disorder    Past Surgical History:    C3-C6 spinal fusion  PE tubes X5  Stress echo  Right ear cholesteoma and tympanoplasty  Left foot ganglion cyst  Tonsillectomy and adenoidectomy    Family History:    Father- MI  Mother- Emphysema, COPD  Maternal grandmother- MI    Social History:  Marital Status:   [2]  Social History   Substance Use Topics     Smoking status: Former Smoker     Packs/day: 2.00     Years: 32.00     Types: Cigarettes     Quit date: 1/1/2009     Smokeless " tobacco: Never Used     Alcohol use No        Review of Systems   Constitutional: Negative for chills and fever.   HENT: Negative for congestion, drooling, ear pain, facial swelling and hearing loss.    Respiratory: Negative for cough and shortness of breath.    Cardiovascular: Negative for chest pain and palpitations.   Gastrointestinal: Negative for abdominal pain, nausea and vomiting.   Musculoskeletal: Negative for arthralgias and myalgias.   Neurological: Positive for facial asymmetry, weakness, numbness and headaches. Negative for dizziness, tremors, seizures, syncope and light-headedness.   Psychiatric/Behavioral: Negative for behavioral problems and confusion.   All other systems reviewed and are negative.    Physical Exam     Vitals:  Patient Vitals for the past 24 hrs:   BP Temp Temp src Pulse Resp SpO2   08/16/17 1516 (!) 164/101 98.1  F (36.7  C) Temporal 79 18 97 %   08/16/17 1515 - - - - - 96 %   08/16/17 1445 (!) 155/91 - - - - -   08/16/17 1430 149/87 - - - - 96 %   08/16/17 1415 148/81 - - - - -   08/16/17 1400 - - - - - 95 %   08/16/17 1330 - - - - - 94 %   08/16/17 1317 (!) 190/107 - Oral 87 16 97 %     Physical Exam  General: Resting comfortably  Head:  Scalp, and head appear normal, atraumatic.  Eyes:  Pupils are equal, round, and reactive to light    No nystagmus, EOMI    Right ptosis. Left facial weakness present with mild flattening of the forehead creases, however the forehead seems spared.    Conjunctivae non-injected and sclerae white  ENT:    The external nose is normal    Pinnae are normal    The oropharynx is normal, mucous membranes moist    Tongue midline, controlling secretions    Uvula is in the midline  Neck:  Normal range of motion, no c-spine tenderness, no lymphadenopathy    There is no rigidity noted    Trachea is in the midline  CV:  Regular rate and rhythm     Normal S1/S2, no S3/S4    No murmur or rub  Resp:  Lungs are clear and equal bilaterally    There is no  tachypnea    No increased work of breathing    No rales, wheezing, or rhonchi  GI:  Abdomen is soft, obese, no rigidity or guarding    No distension, or mass    No rebound tenderness   MS:  Normal muscular tone    Symmetric motor strength    No lower extremity edema  Skin:  No rash or acute skin lesions noted  Neuro:  A&Ox3, GCS 15    EOMI intact, vision grossly normal. Left facial droop as noted above.    Speech mildly slurred, no aphasia, otherwise fluent    Strength 5/5 and symmetric in bilateral upper and lower extremities.    No pronator drift. No leg drift.    No ankle clonus    FTN testing normal. No tremor.     No meningismus    Psych:  Normal affect.  Appropriate interactions.              Emergency Department Course     ECG:  ECG taken at 1358, ECG read at 1400  Normal sinus rhythm  Normal ECG  Rate 77 bpm. ND interval 166 ms. QRS duration 92 ms. QT/QTc 402/454 ms. P-R-T axes 67 83 78.    Imaging:  Radiology findings were communicated with the patient who voiced understanding of the findings.    CT Head w/o Contrast   Final Result   IMPRESSION:    1. Normal CT scan of the brain.   2. Right-sided mastoidectomy.      ZAN BOB MD      MR Brain w/o & w Contrast    (Results Pending)   MR Brain & Angioram    (Results Pending)     Laboratory:  Laboratory findings were communicated with the patient who voiced understanding of the findings.    INR: 0.92  PTT 36  Glucose 178 (H)  CBC: WBC 8.2, HGB 16.5,   BMP: Ca 8.3 (L), o/w WNL (Creatinine 0.93)    Emergency Department Course:  Nursing notes and vitals reviewed.  I performed an exam of the patient as documented above.   IV was inserted and blood was drawn for laboratory testing, results above.  The patient was sent for a head CT while in the emergency department, results above.   I discussed the treatment plan with the patient. They expressed understanding of this plan and consented to admission. I discussed the patient with Dr. Casey, who will admit  the patient to a monitored bed for further evaluation and treatment.   I personally reviewed the lab and imaging results with the patient and answered all related questions prior to admission.  Impression & Plan      Medical Decision Making:  Pt presents with subacute left facial droop and right ptosis. Presentation not consistent with Maico's syndrome. DDx includes stroke, ICH, complex migraine, hypoglycemia, intracranial mass lesion, Poplar palsy.     Deficits seem to be forehead sparing although he does have some left forehead crease flattening compared to the right side. GIven his HTN, age, and prediabetes I feel that he requires full workup for stroke or other causes as noted above. Right ptosis is also not consistent with Poplar palsy as well as the fluctuating nature of the intensity of symptoms. Given that his deficits are fairly minor with low NIHSS and being 4 days from symptom onset I did not activate a code stroke as patient is not a tPA or emergent neurointervention candidate.     Plan: CT head, labs, ECG, fingerstick glucose, neurochecks, IVF    CT did not show any acute findings. Workup was otherwise unremarkable. Given persistent symptoms patient will require admission for further evaluation and treatment, likely with MRI and neurology consultation. Case discussed with Dr. Casey who agrees with admission.     Diagnosis:    ICD-10-CM    1. Facial droop R29.810    2. Ptosis, right H02.401      Disposition:   Admission      Scribe Disclosure:  I, Ignacio Scruggs, am serving as a scribe at 1:38 PM on 8/16/2017 to document services personally performed by Tanner Petit MD, based on my observations and the provider's statements to me.      [facility] EMERGENCY DEPARTMENT       Tanner Petit MD  08/16/17 [id number]

## 2017-08-16 NOTE — PHARMACY-ADMISSION MEDICATION HISTORY
Admission medication history interview status for this patient is complete. See [facility] admission navigator for allergy information, prior to admission medications and immunization status.     Medication history interview source(s):Patient  Medication history resources (including written lists, pill bottles, clinic record):None  [facility]    Changes made to PTA medication list:  Added:   Deleted: Cortisporin Ear Drops  Changed:     Actions taken by pharmacist (provider contacted, etc):None     Additional medication history information:None    Medication reconciliation/reorder completed by provider prior to medication history? No    For patients on insulin therapy: No    Prior to Admission medications    Medication Sig Last Dose Taking? Auth Provider   losartan-hydrochlorothiazide (HYZAAR) 50-12.5 MG per tablet TAKE ONE TABLET BY MOUTH DAILY 8/16/2017 at Unknown time Yes Maxwell Back MD

## 2017-08-16 NOTE — PROGRESS NOTES
PRIMARY DIAGNOSIS: Rule out stroke vs Tinnie Palsy  OUTPATIENT/OBSERVATION GOALS TO BE MET BEFORE DISCHARGE:  1. ADLs back to baseline: Yes    2. Activity and level of assistance:  Ambulating independently.     3. Pain status: Pain free.    4. Return to near baseline physical activity: Yes     Discharge Planner Nurse   Safe discharge environment identified: Yes  Barriers to discharge: Yes, MRI pending.  Monitor for neuro changes.       Entered by: Anayeli Macdonald 08/16/2017 6:15 PM     Please review provider order for any additional goals.   Nurse to notify provider when observation goals have been met and patient is ready for discharge.

## 2017-08-16 NOTE — ED NOTES
..  [facility]  ED Nurse Handoff Report    Yannick Jean is a 57 year old male   ED Chief complaint: Facial Droop (started Sunday much worse today)  . ED Diagnosis:   Final diagnoses:   Facial droop   Ptosis, right     Allergies:   Allergies   Allergen Reactions     Penicillins        Code Status: Full Code  Activity level - Baseline/Home:  Independent. Activity Level - Current:   Independent. Lift room needed: No. Bariatric: No   Needed: No   Isolation: No. Infection: Not Applicable.     Vital Signs:   Vitals:    08/16/17 1400 08/16/17 1415 08/16/17 1430 08/16/17 1445   BP:  148/81 149/87 (!) 155/91   Pulse:       Resp:       TempSrc:       SpO2: 95%  96%        Cardiac Rhythm:  ,      Pain level: 0-10 Pain Scale: 0  Patient confused: No. Patient Falls Risk: No.   Elimination Status: Has voided   Patient Report - Initial Complaint: Pt states Sunday started noticing some altered sensation in his left lower cheek, states he has some chronic teeth issues and figured it was from that.  States today was having a hard time drinking his coffee and then noted at work from [id number] that drooping in right eye was getting significantly worse. Focused Assessment: AOx4, drooping in right eye and bilateral eyebrow raises have significantly improved per previous RN and MD Petit.    Tests Performed: Labs, EKG, CT. Abnormal Results: CT Pending, Labs ok.   Treatments provided: Monitoring  Family Comments: Two sons at bedside  OBS brochure/video discussed/provided to patient:  Yes  ED Medications:   Medications   aspirin tablet 325 mg (not administered)     Drips infusing:  No  For the majority of the shift this patient was Yellow. Interventions performed were Monitoring.     Severe Sepsis OR Septic Shock Diagnosis Present: No      ED Nurse Name/Phone Number: ROME Trejo RN  3:20 PM    RECEIVING UNIT ED HANDOFF REVIEW    Above ED Nurse Handoff Report was reviewed: Yes  Reviewed by: Anayeli Cisse  Renae on August 16, 2017 at 4:48 PM

## 2017-08-16 NOTE — H&P
CHIEF COMPLAINT:  Left facial droop.      HISTORY OF PRESENT ILLNESS:  The patient Yannick Jean is a 57-year-old man with a history of hypertension and pre-diabetes who presents with a left facial droop.  The patient states that he was in his usual state of health when on  he noticed numbness on the left side of his face and numbness of the buccal mucosa.  He noticed droopiness of the left side of his face.  He cannot close his left eyelid which he describes as droopy.  His symptoms have progressed since .  His son felt that his father's speech was somewhat slurred today, and the patient decided to come into the Emergency Department.  His son mentioned that, while he was talking to his father on the phone, his father's voice was monotone in quality.  The patient had a mild headache earlier which is better after taking ibuprofen.  He denies numbness, tingling or paresthesias of his extremities.  He denies similar symptoms in the past.        Here in the Emergency Department the patient was seen, and I discussed the case with the Emergency Department Physician.  The patient was given 1 aspirin, and a CT scan of the head did not show an acute process.  I am asked to admit the patient for further evaluation.      ALLERGIES:  Penicillin.      HOME MEDICATIONS include Hyzaar.      PAST MEDICAL HISTORY:     1.  Hypertension.   2.  Pre-diabetes.   3.  Mixed hyperlipidemia.      PAST SURGICAL HISTORY:     1.  Cervical spine fusion.   2.  Mastoidectomy on the right.   3.  Tonsillectomy.      FAMILY HISTORY:     1.  Cerebrovascular disease in his father who  with a myocardial infarction.   2.  An uncle  with a myocardial infarction.      SOCIAL HISTORY:  The patient is .  He does not smoke or drink alcohol.  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      REVIEW OF SYSTEMS:  As above in the History of Present Illness.     GENERAL:  He denies recent travel   CONSTITUTIONAL:  He denies  fever or chills.     GASTROINTESTINAL:  He denies nausea or vomiting.   NEUROLOGIC:  He denies paresthesias or focal weakness to his upper or lower extremities.   OTHER systems extensively reviewed and deemed unremarkable/negative.      PHYSICAL EXAMINATION:   VITAL SIGNS:  Temperature 98.1, pulse 79, blood pressure 153/108, respiratory rate 18, O2 saturations  94% on room air.   GENERAL:  He is alert, awake, oriented, coherent, in no acute distress.   HEENT:  His pupils are equal, round, reactive to light.  He has difficulty closing his left eyelid.   LUNGS:  Clear to auscultation bilaterally.   HEART:  Regular rate, S1, S2 normal, no murmurs or gallops.   ABDOMEN:  Soft, nontender, good bowel sounds.   EXTREMITIES:  No edema.   NEUROLOGIC:  Cranial nerves II-XII are grossly within normal limits.  Speech is clear.  Sensation is grossly within normal limits.  He does have lack of nasolabial fold on the left.  Facial sensation is grossly within normal limits.  Motor strength is 5/5 in all extremities.  There is no clonus.  Reflexes are symmetrical.      LABORATORY AND IMAGING RESULTS:    1.  Basic metabolic panel grossly within normal limits other than a glucose of 179.     2.  CBC with differential grossly unremarkable.     3.  CT scan of the head without contrast is normal with right-sided mastoidectomy.     4.  EKG shows normal sinus rhythm at 77 beats per minute.      ASSESSMENT AND PLAN:   1.  Left-sided facial weakness.  I suspect this is likely secondary to Bell's palsy.  However, given the patient's risk factors which include age, pre-diabetes, hypertension, family history of neurovascular events and to ensure that there is no underlying stroke a CT scan of the head was performed which did not show an acute process.  I will monitor him on telemetry and will obtain an MRI of the brain with an MRA.  If this is Bell's palsy the patient may benefit from steroids.  However, the timetable may be off as his symptoms  began on .  We will admit him to Observation status.   2.  Code status full.         LIAM HOWELL MD             D: 2017 16:29   T: 2017 17:08   MT: [id number]      Name:     PAULA ELMORE   MRN:      [id number]        Account:      [id number]   :      1960           Admitted:     [id number]      Document: [id number]       cc: Maxwell Back MD

## 2017-08-16 NOTE — TELEPHONE ENCOUNTER
Frankie is calling to say that Sunday food started tasting strange and the left side of his mouth numb.  Today his whole mouth is numb.  Right eye is drooping.  Can't purse his lips.  I let him know he needs to go to ED.  He said yes I thought you would say that..he will go to [facility].  I asked for him to have someone drive him but he said he doesn't have anyone to drive him. Call 911 if needed.  Lore Hernandez RN- Triage [employer]

## 2017-08-16 NOTE — IP AVS SNAPSHOT
[place] [address] [facility]    [address]    [facility] [address]    Phone:  [phone]    Fax:  [phone]                                       After Visit Summary   8/16/2017    Yannick Jean    MRN: [id number]           After Visit Summary Signature Page     I have received my discharge instructions, and my questions have been answered. I have discussed any challenges I see with this plan with the nurse or doctor.    ..........................................................................................................................................  Patient/Patient Representative Signature      ..........................................................................................................................................  Patient Representative Print Name and Relationship to Patient    ..................................................               ................................................  Date                                            Time    ..........................................................................................................................................  Reviewed by Signature/Title    ...................................................              ..............................................  Date                                                            Time

## 2017-08-16 NOTE — PLAN OF CARE
Problem: Goal Outcome Summary  Goal: Goal Outcome Summary  Outcome: Improving  Pt alert and oriented.  Slight left mouth droop present.  Other neuro signs WNL..  Denies pain or distress;  Blood pressure elevated.  Telemetry SR.  To have MRI.  IV fluiids per MD orders.  Tolerating clear liquid diet po.

## 2017-08-16 NOTE — IP AVS SNAPSHOT
MRN:[id number]                      After Visit Summary   8/16/2017    Yannick Jean    MRN: [id number]           Thank you!     Thank you for choosing [facility] for your care. Our goal is always to provide you with excellent care. Hearing back from our patients is one way we can continue to improve our services. Please take a few minutes to complete the written survey that you may receive in the mail after you visit. If you would like to speak to someone directly about your visit please contact Patient Relations at [phone]. Thank you!          Patient Information     Date Of Birth          1960        Designated Caregiver       Most Recent Value    Caregiver    Will someone help with your care after discharge? yes    Name of designated caregiver Lynnette jean, wife    Phone number of caregiver [phone]    Caregiver address [address] [place], [place]      About your hospital stay     You were admitted on:  August 16, 2017 You last received care in the:  [place] [address] [facility]    You were discharged on:  August 17, 2017        Reason for your hospital stay       You were registered to observation for facial droop. A CT of the head was obtained that was negative. Then an MRI and MR angiogram of the head was also obtained that was negative for stroke. Your facial droop is due to bell's palsy. You were started on IV steroids and monitored overnight.                  Who to Call     For medical emergencies, please call 911.  For non-urgent questions about your medical care, please call your primary care provider or clinic, [phone]          Attending Provider     Provider Specialty    Tanner Petit MD Emergency Medicine    Farideh Casey MD Internal Medicine       Primary Care Provider Office Phone # Fax #    Maxwell Back [phone]      After Care Instructions     Activity       Your activity upon discharge: activity as  "tolerated            Diet       Follow this diet upon discharge: Regular                  Follow-up Appointments     Follow-up and recommended labs and tests        1. Follow up with your family doctor within 1 week for re-evaluation after this admission.   2. Recheck your blood pressure at that time, you were started on a medication called Norvasc in addition to your Hyzaar due to elevated blood pressure this admission.   3. Follow up with Neurology in 4 weeks.[facility]  [phone]  4. Use over the counter lubricating eye drops in the left eye to keep the eye moist.                  Additional Services     Neurology Adult Referral       [facility]   [phone]                  Pending Results     No orders found for last 3 day(s).            Statement of Approval     Ordered          08/17/17 1008  I have reviewed and agree with all the recommendations and orders detailed in this document.  EFFECTIVE NOW     Approved and electronically signed by:  Brittnee Slade PA-C             Admission Information     Date & Time Provider Department Dept. Phone    8/16/2017 Farideh Casey MD [place] [address] [facility] [phone]      Your Vitals Were     Blood Pressure Pulse Temperature Respirations Height Weight    177/98 (BP Location: Left arm) 101 97.6  F (36.4  C) (Oral) 18 1.727 m (5' 8\") 101.4 kg (223 lb 9.6 oz)    Pulse Oximetry BMI (Body Mass Index)                91% 34 kg/m2          [employer] Information     [employer] lets you send messages to your doctor, view your test results, renew your prescriptions, schedule appointments and more. To sign up, go to [email or web address] . Click on \"Log in\" on the left side of the screen, which will take you to the Welcome page. Then click on \"Sign up Now\" on the right side of the page.     You will be asked to enter the access code listed below, as well as some personal information. Please follow the directions " to create your username and password.     Your access code is: [email or web address]  Expires: 11/15/2017 10:25 AM     Your access code will  in 90 days. If you need help or a new code, please call your [place] clinic or [phone].        Care EveryWhere ID     This is your Care EveryWhere ID. This could be used by other organizations to access your [place] medical records  [id number]        Equal Access to Services     KEVIN BOATENG : Hadii aad ku hadasho Soomaali, waaxda luqadaha, qaybta kaalmada adeegyada, waxay piliin hayaan benito gardnerflorimaxwell lajose cruz . So [facility] [phone].    ATENCIÓN: Si habla español, tiene a keys disposición servicios gratuitos de asistencia lingüística. Llame al [phone].    We comply with applicable federal civil rights laws and [place] laws. We do not discriminate on the basis of race, color, national origin, age, disability sex, sexual orientation or gender identity.               Review of your medicines      START taking        Dose / Directions    amLODIPine 5 MG tablet   Commonly known as:  NORVASC   Used for:  Hypertension goal BP (blood pressure) < 140/90        Dose:  5 mg   Take 1 tablet (5 mg) by mouth daily   Quantity:  30 tablet   Refills:  0       predniSONE 20 MG tablet   Commonly known as:  DELTASONE   Notes to Patient:  Please note that this medication is a tapered dose.  Follow the mg specifically for each day's dose.        Dose:  60 mg   Take 3 tablets (60 mg) by mouth daily for 7 days, then 2 tabs daily for 4 days, then 1 tab daily for 3 days, then 1/2 tab daily for 3 days   Quantity:  34 tablet   Refills:  0       valACYclovir 1000 mg tablet   Commonly known as:  VALTREX        Dose:  1000 mg   Take 1 tablet (1,000 mg) by mouth 3 times daily   Quantity:  21 tablet   Refills:  0         CONTINUE these medicines which have NOT CHANGED        Dose / Directions    losartan-hydrochlorothiazide 50-12.5 MG per tablet   Commonly known as:  HYZAAR   Used for:  Hypertension  goal BP (blood pressure) < 140/90        TAKE ONE TABLET BY MOUTH DAILY   Quantity:  90 tablet   Refills:  1            Where to get your medicines      These medications were sent to [facility] - [place], [place] - [address]  [address] [address]     Phone:  [phone]     amLODIPine 5 MG tablet    predniSONE 20 MG tablet    valACYclovir 1000 mg tablet                Protect others around you: Learn how to safely use, store and throw away your medicines at www.disposemymeds.org.             Medication List: This is a list of all your medications and when to take them. Check marks below indicate your daily home schedule. Keep this list as a reference.      Medications           Morning Afternoon Evening Bedtime As Needed    amLODIPine 5 MG tablet   Commonly known as:  NORVASC   Take 1 tablet (5 mg) by mouth daily   Last time this was given:  5 mg on 8/17/2017  1:51 AM                                   losartan-hydrochlorothiazide 50-12.5 MG per tablet   Commonly known as:  HYZAAR   TAKE ONE TABLET BY MOUTH DAILY   Last time this was given:  1 tablet on 8/17/2017  9:15 AM                                   predniSONE 20 MG tablet   Commonly known as:  DELTASONE   Take 3 tablets (60 mg) by mouth daily for 7 days, then 2 tabs daily for 4 days, then 1 tab daily for 3 days, then 1/2 tab daily for 3 days   Last time this was given:  60 mg on 8/17/2017  8:15 AM   Notes to Patient:  Please note that this medication is a tapered dose.  Follow the mg specifically for each day's dose.            You may want to take this med in the AM, as it can cause increase in awake state.                       valACYclovir 1000 mg tablet   Commonly known as:  VALTREX   Take 1 tablet (1,000 mg) by mouth 3 times daily                                                   More Information        Bell s Palsy  Bell s palsy is a nerve disorder that usually happens suddenly and without  warning. This condition happens when a nerve that controls facial movement is damaged. Nerve damage can happen for many reasons. But most cases of Bell s palsy are probably caused by a virus.  Symptoms of Bell s palsy  Here are signs of the disorder:     Mild weakness to total paralysis of one side of your face    Drooping mouth, drooling on one side of mouth    Trouble closing one eye    Noises seeming louder than usual    Change in your sense of taste  When to go to the emergency department (ED)  There are conditions, such as stroke, that may look like Bell's palsy and are medical emergencies. Therefore, you should seek emergent medical care if you notice facial weakness or drooping. Although Bell s palsy can be alarming, it s rarely serious. Many people begin to improve in about 2 weeks, even without treatment. It is important to be seen as soon as possible. Most research shows that treatment with beneficial results was received within the first few days of symptoms.   Treatment  To treat Bell s palsy, you may be given steroid medicines. This helps reduce swelling of the affected nerve. In some cases, your healthcare provider may prescribe an antiviral medicine. Your open eye may be covered with a patch to prevent it from drying out. You also may need to use eye drops and ointments for a time. Your healthcare provider will discuss follow-up care with you, including the possible need for further treatment to help your facial muscles return to normal.  Date Last Reviewed: 10/7/2015    2290-9935 [employer]. [address] [place] [address], [place] [address]. All rights reserved. This information is not intended as a substitute for professional medical care. Always follow your healthcare professional's instructions.

## 2017-08-17 VITALS
RESPIRATION RATE: 18 BRPM | OXYGEN SATURATION: 91 % | HEIGHT: 68 IN | TEMPERATURE: 97.6 F | BODY MASS INDEX: 33.89 KG/M2 | WEIGHT: 223.6 LBS | HEART RATE: 101 BPM | SYSTOLIC BLOOD PRESSURE: 177 MMHG | DIASTOLIC BLOOD PRESSURE: 98 MMHG

## 2017-08-17 LAB
GLUCOSE BLDC GLUCOMTR-MCNC: 112 MG/DL (ref 70–99)
GLUCOSE BLDC GLUCOMTR-MCNC: 131 MG/DL (ref 70–99)
HBA1C MFR BLD: 6.1 % (ref 4.3–6)
INTERPRETATION ECG - MUSE: NORMAL

## 2017-08-17 PROCEDURE — 25000125 ZZHC RX 250: Performed by: INTERNAL MEDICINE

## 2017-08-17 PROCEDURE — 25000132 ZZH RX MED GY IP 250 OP 250 PS 637: Performed by: INTERNAL MEDICINE

## 2017-08-17 PROCEDURE — 25000132 ZZH RX MED GY IP 250 OP 250 PS 637: Performed by: PHYSICIAN ASSISTANT

## 2017-08-17 PROCEDURE — G0378 HOSPITAL OBSERVATION PER HR: HCPCS

## 2017-08-17 PROCEDURE — 00000146 ZZHCL STATISTIC GLUCOSE BY METER IP

## 2017-08-17 PROCEDURE — 83036 HEMOGLOBIN GLYCOSYLATED A1C: CPT | Performed by: INTERNAL MEDICINE

## 2017-08-17 PROCEDURE — 25000131 ZZH RX MED GY IP 250 OP 636 PS 637: Performed by: INTERNAL MEDICINE

## 2017-08-17 PROCEDURE — 36415 COLL VENOUS BLD VENIPUNCTURE: CPT | Performed by: INTERNAL MEDICINE

## 2017-08-17 PROCEDURE — 99217 ZZC OBSERVATION CARE DISCHARGE: CPT | Performed by: PHYSICIAN ASSISTANT

## 2017-08-17 RX ORDER — AMLODIPINE BESYLATE 5 MG/1
5 TABLET ORAL DAILY
Qty: 30 TABLET | Refills: 0 | Status: SHIPPED | OUTPATIENT
Start: 2017-08-17 | End: 2017-09-08 | Stop reason: SINTOL

## 2017-08-17 RX ORDER — VALACYCLOVIR HYDROCHLORIDE 1 G/1
1000 TABLET, FILM COATED ORAL 3 TIMES DAILY
Qty: 21 TABLET | Refills: 0 | Status: SHIPPED | OUTPATIENT
Start: 2017-08-17 | End: 2017-08-25

## 2017-08-17 RX ORDER — PREDNISONE 20 MG/1
60 TABLET ORAL DAILY
Qty: 34 TABLET | Refills: 0 | Status: SHIPPED | OUTPATIENT
Start: 2017-08-17 | End: 2017-09-08

## 2017-08-17 RX ORDER — LOSARTAN POTASSIUM AND HYDROCHLOROTHIAZIDE 12.5; 5 MG/1; MG/1
1 TABLET ORAL DAILY
Status: DISCONTINUED | OUTPATIENT
Start: 2017-08-17 | End: 2017-08-17 | Stop reason: HOSPADM

## 2017-08-17 RX ADMIN — LOSARTAN POTASSIUM AND HYDROCHLOROTHIAZIDE 1 TABLET: 50; 12.5 TABLET, FILM COATED ORAL at 09:15

## 2017-08-17 RX ADMIN — PREDNISONE 60 MG: 10 TABLET ORAL at 01:51

## 2017-08-17 RX ADMIN — PREDNISONE 60 MG: 10 TABLET ORAL at 08:15

## 2017-08-17 RX ADMIN — AMLODIPINE BESYLATE 5 MG: 5 TABLET ORAL at 01:51

## 2017-08-17 NOTE — DISCHARGE SUMMARY
Formerly [facility], later [facility] Outpatient / Observation Unit  Discharge Summary        Yannick Jean MRN# [id number]   YOB: 1960 Age: 57 year old     Date of Admission:  8/16/2017  Date of Discharge:  8/17/2017  Admitting Physician:  Farideh Casey MD  Discharge Physician: Brittnee Slade PA-C, MONICA  Discharging Service: Hospitalist      Primary Provider: Maxwell Back  Primary Care Physician Phone Number: [phone]         Primary Discharge Diagnoses:    Yannick Jean is a pleasant 58yo male with a past medical history significant for HTN who was admitted on 8/16/2017 with complaints of facial droop on the left concerning for CVA vs bells palsy. Initial head CT in the ER was negative, patient registered to observation to complete workup to thoroughly rule out CVA. MRI/MRA was obtained and were negative. Patient continues to have left facial palsy and intermittent tingling. No other focal abnormalities. He was started on IV steroids and monitored overnight.     1.[place] Palsy - with left facial droop. Patient will be discharged on high dose prednisone taper as well as Valtrex 1gram TID for 7 days. He will follow up with his PCP in 1 week for recheck and with Neurology in 4 weeks. I recommend lubricating eye drops to the left eye frequently throughout the day as well.     2. Hypertension - patient's BP has been elevated this admission. He states he was just started on Hyzaar in May 2017 and his BP has been in the 130/90s since then. Norvasc 5mg daily was added to his current Hyzaar 50/12.5. He will follow up with his PCP in 1 week to recheck this and adjust meds as needed.           Secondary Discharge Diagnoses:     Past Medical History:   Diagnosis Date     CARDIOVASCULAR SCREENING; LDL GOAL LESS THAN 130      Cervical stenosis of spine 5/15    central and foraminal - moderate to severe     CKD (chronic kidney disease) stage 2, GFR 60-89 ml/min 05/2017     Colon polyps, hyperplastic 6/09    x 4 - due 2019      ED (erectile dysfunction)     dr atkinson     Hypertension goal BP (blood pressure) < 140/90      Mixed hearing loss     Dr Lau     Prediabetes 05/2017     Tobacco use disorder     quit 1/09            Code Status:      Full Code        Brief Hospital Summary:         Please refer to initial admission history and physical for further details.   Briefly, Yannick Jean was admitted on 8/16/2017 for complaints of                                     facial droop on the left for 4 days concerning for CVA vs bells palsy.     Initial work up in the ED revealed a negative CT scan of the head for any acute process.   EKG did not reveal evidence of significant cardiac arrhythmias or ischemia.  Pt was registered to Observation for further evaluation.     Pt was placed on telemetry and underwent frequent neuro checks.   Pt was anticoagulated with ASA upon initial arrival.  Laboratory results were reviewed and significant findings addressed.   MRI/MRA of the brain and neck (with results listed below). No evidence of acute CVA was found and patient's history and exam are most consistent with bells palsy.  On the day of discharge, patient's symptoms were stable, telemetry did not reveal any significant arrhythmias, vitals remained stable and pt was deemed safe for discharge. Medications were reviewed and adjustments made as necessary. Pt is instructed to follow up as below.           Significant Lab During Hospitalization:      No results for input(s): PH, PHV, PO2, PO2V, SAT, PCO2, PCO2V, HCO3, HCO3V in the last 168 hours.    Recent Labs  Lab 08/16/17  1320   WBC 8.2   HGB 16.5   HCT 48.1   MCV 87             Lab Results   Component Value Date     08/16/2017     05/24/2017     05/17/2017    Lab Results   Component Value Date    CHLORIDE 105 08/16/2017    CHLORIDE 106 05/24/2017    CHLORIDE 108 05/17/2017    Lab Results   Component Value Date    BUN 17 08/16/2017    BUN 21 05/24/2017    BUN 19  05/17/2017      Lab Results   Component Value Date    POTASSIUM 3.8 08/16/2017    POTASSIUM 4.5 05/24/2017    POTASSIUM 4.2 05/17/2017    Lab Results   Component Value Date    CO2 26 08/16/2017    CO2 26 05/24/2017    CO2 26 05/17/2017    Lab Results   Component Value Date    CR 0.93 08/16/2017    CR 0.96 05/24/2017    CR 1.00 05/17/2017        No results for input(s): CULT in the last 168 hours.    Recent Labs  Lab 08/16/17  1329 08/16/17  1320   NA  --  139   POTASSIUM  --  3.8   CHLORIDE  --  105   CO2  --  26   ANIONGAP  --  8   GLC  --  179*   BUN  --  17   CR  --  0.93   GFRESTIMATED 76 83   GFRESTBLACK >90 >90   JAYANT  --  8.3*     No results for input(s): NTBNPI, NTBNP in the last 168 hours.  No results for input(s): DD in the last 168 hours.  No results for input(s): SED, CRP in the last 168 hours.    Recent Labs  Lab 08/16/17  1320   INR 0.92     No results for input(s): LACT in the last 168 hours.  No results for input(s): LIPASE in the last 168 hours.  No results for input(s): TSH in the last 168 hours.  No results for input(s): TROPONIN, TROPI, TROPR in the last 168 hours.    Invalid input(s): TROP, TROPONINIES  No results for input(s): COLOR, APPEARANCE, URINEGLC, URINEBILI, URINEKETONE, SG, UBLD, URINEPH, PROTEIN, UROBILINOGEN, NITRITE, LEUKEST, RBCU, WBCU in the last 168 hours.               Significant Imaging During Hospitalization:      Recent Results (from the past 48 hour(s))   CT Head w/o Contrast    Narrative    CT SCAN OF THE HEAD WITHOUT CONTRAST   8/16/2017 3:16 PM     HISTORY: Left facial droop since Sunday. Right-sided numbness. Unable  to close left eye. Weakness in right face and drooping right eyelid.    TECHNIQUE:  Axial images of the head and coronal reformations without  IV contrast material. Radiation dose for this scan was reduced using  automated exposure control, adjustment of the mA and/or kV according  to patient size, or iterative reconstruction technique.    COMPARISON:  None.    FINDINGS:  The ventricles are normal in size, shape and configuration.   The brain parenchyma and subarachnoid spaces are normal. There is no  evidence of intracranial hemorrhage, mass, acute infarct or anomaly.     Previous canal wall down mastoidectomy is noted on the right.  Opacification in remaining right mastoid air cells. Left mastoid  appears normal. Visualized sinuses are clear. There is no evidence of  trauma.      Impression    IMPRESSION:   1. Normal CT scan of the brain.  2. Right-sided mastoidectomy.    ZAN BOB MD   MR Brain w/o & w Contrast    Narrative    MRI BRAIN WITHOUT AND WITH CONTRAST  8/16/2017 8:30 PM    HISTORY:  Left-sided facial weakness, rule out CVA.    TECHNIQUE:  Multiplanar, multisequence MRI of the brain without and  with 10 mL Gadavist.    COMPARISON: Head CT on same date.    FINDINGS: Diffusion-weighted images are normal. There is no evidence  for intracranial hemorrhage or any acute infarct. There are a few tiny  nonspecific white matter changes in both hemispheres without mass  effect or enhancement. Brain parenchyma is otherwise normal.  Ventricles and subarachnoid spaces are normal. Vascular structures are  patent at the skull base. Postcontrast images do not show any abnormal  areas of enhancement or any focal mass lesions. Fluid or inflammatory  changes are seen in both mastoid air cells. There is no evidence for  nasopharyngeal mass.      Impression    IMPRESSION:  1. No evidence for intracranial hemorrhage, acute infarct, or any  focal mass lesions.  2. A few tiny nonspecific white matter lesions.  3. Fluid or inflammatory changes in both mastoid air cells.    ANG BLEVINS MD   MRA Head w/o Contrast Angiogram    Narrative    MRA ANGIOGRAM HEAD W/O CONTRAST 8/16/2017 8:31 PM    HISTORY: Left-sided facial droop.    TECHNIQUE: 3D time-of-flight MR angiography was performed through the  [facility].    FINDINGS: The distal internal carotid arteries, basilar  artery, and  proximal anterior, middle, and posterior cerebral arteries are patent.  There is no evidence for any large vessel occlusion or stenosis. There  is no evidence for a saccular aneurysm.      Impression    IMPRESSION: Negative MR angiography of the [facility] [place].    ANG BLEVINS MD              Pending Results:      Unresulted Labs Ordered in the Past 30 Days of this Admission     No orders found for last 61 day(s).              Consultations This Hospital Stay:      No consultations were requested during this admission         Discharge Instructions and Follow-Up:      Follow-up Appointments     Follow-up and recommended labs and tests        1. Follow up with your family doctor within 1 week for re-evaluation   after this admission.   2. Recheck your blood pressure at that time, you were started on a   medication called Norvasc in addition to your Hyzaar due to elevated blood   pressure this admission.   3. Follow up with Neurology in 4 weeks.[facility]    [phone]  4. Use over the counter lubricating eye drops in the left eye to keep the   eye moist.                      Discharge Disposition:      Discharged to home         Discharge Medications:        Current Discharge Medication List      START taking these medications    Details   predniSONE (DELTASONE) 20 MG tablet Take 3 tablets (60 mg) by mouth daily for 7 days, then 2 tabs daily for 4 days, then 1 tab daily for 3 days, then 1/2 tab daily for 3 days  Qty: 34 tablet, Refills: 0    Associated Diagnoses: Bell's palsy      valACYclovir (VALTREX) 1000 mg tablet Take 1 tablet (1,000 mg) by mouth 3 times daily  Qty: 21 tablet, Refills: 0    Associated Diagnoses: Bell's palsy      amLODIPine (NORVASC) 5 MG tablet Take 1 tablet (5 mg) by mouth daily  Qty: 30 tablet, Refills: 0    Associated Diagnoses: Hypertension goal BP (blood pressure) < 140/90         CONTINUE these medications which have NOT CHANGED    Details  "  losartan-hydrochlorothiazide (HYZAAR) 50-12.5 MG per tablet TAKE ONE TABLET BY MOUTH DAILY  Qty: 90 tablet, Refills: 1    Associated Diagnoses: Hypertension goal BP (blood pressure) < 140/90                 Allergies:         Allergies   Allergen Reactions     Penicillins            Condition and Physical on Discharge:      Discharge condition: Stable   Vitals: Blood pressure (!) 177/98, pulse 101, temperature 97.6  F (36.4  C), temperature source Oral, resp. rate 18, height 1.727 m (5' 8\"), weight 101.4 kg (223 lb 9.6 oz), SpO2 91 %.  223 lbs 9.6 oz      GENERAL:  Comfortable.  PSYCH: pleasant, oriented, No acute distress.  HEENT:  PERRLA. Normal conjunctiva, normal hearing, nasal mucosa and Oropharynx are normal.  NECK:  Supple, no neck vein distention, adenopathy or bruits, normal thyroid.  HEART:  Normal S1, S2 with no murmur, no pericardial rub, gallops or S3 or S4.  LUNGS:  Clear to auscultation, normal Respiratory effort. No wheezing, rales or ronchi.  ABDOMEN:  Soft, no hepatosplenomegaly, normal bowel sounds. Non-tender, non distended.   EXTREMITIES:  No pedal edema, +2 pulses bilateral and equal.  SKIN:  Dry to touch, No rash, wound or ulcerations.  NEUROLOGIC:  Left facial palsy, BL 5/5 symmetric upper and lower extremity strength, sensation is intact with no focal deficits.         "

## 2017-08-17 NOTE — PLAN OF CARE
Problem: Discharge Planning  Goal: Discharge Planning (Adult, OB, Behavioral, Peds)  Outcome: Adequate for Discharge Date Met:  08/17/17  Neuro checks completed.  Facial droop to left face.  Right eye has slight droop as well.  Pt. Reports numbness to left face only.  Has taste disturbance as well.  Pt. States that he is feeling well this morning and denies any other problems.  Hand strength,  are even, strong as well as plantar-dorsi flexion.  Pt. Ambulated hallways, tolerated breakfast.  Pt. Dc at 1030 , was ambulatory getting a ride home from spouse.  Pt. Received education on DC meds and confirmed  at his pharmacy.

## 2017-08-17 NOTE — PROGRESS NOTES
PRIMARY DIAGNOSIS: Rule out stroke vs [place] Palsy  OUTPATIENT/OBSERVATION GOALS TO BE MET BEFORE DISCHARGE:  1. ADLs back to baseline: Yes    2. Activity and level of assistance:  Ambulating independently.     3. Pain status: Pain free.    4. Return to near baseline physical activity: Yes     Discharge Planner Nurse   Safe discharge environment identified: Yes  Barriers to discharge: Yes, MRI pending.  Monitor for neuro changes.       Entered by: Nathaly Lombardi 08/17/2017 4:29 AM     Please review provider order for any additional goals.   Nurse to notify provider when observation goals have been met and patient is ready for discharge.    Pt down to to MRI last evening, results neg for CVA. Neuros intact. Pt still has left side facial droop. Tele SR. Up independently in room. Will continue to monitor.

## 2017-08-17 NOTE — PLAN OF CARE
Problem: Goal Outcome Summary  Goal: Goal Outcome Summary  Outcome: Improving  OBSERVATION patient END time: 1035

## 2017-08-17 NOTE — PROGRESS NOTES
PRIMARY DIAGNOSIS: Rule out stroke vs [place] Palsy  OUTPATIENT/OBSERVATION GOALS TO BE MET BEFORE DISCHARGE:  1. ADLs back to baseline: Yes    2. Activity and level of assistance:  Ambulating independently.     3. Pain status: Pain free.    4. Return to near baseline physical activity: Yes     Discharge Planner Nurse   Safe discharge environment identified: Yes  Barriers to discharge: Yes, MRI pending.  Monitor for neuro changes.       Entered by: Nathaly Lombardi 08/17/2017 4:22 AM     Please review provider order for any additional goals.   Nurse to notify provider when observation goals have been met and patient is ready for discharge.    Pt sleeping comfortably overnight. Started on scheduled oral prednisone. 0200 blood sugar 112. Continue with plan of care.

## 2017-08-17 NOTE — PLAN OF CARE
Problem: Stroke (Ischemic) (Adult)  Goal: Signs and Symptoms of Listed Potential Problems Will be Absent or Manageable (Stroke)  Signs and symptoms of listed potential problems will be absent or manageable by discharge/transition of care (reference Stroke (Ischemic) (Adult) CPG).  Outcome: Improving  VSS, /99, LS are clear, denies pain. Pt up w/sba x1. Candida clears, but wants regular diet, very hungry . MD called. MRI was neg for CVA.  Neuros intact, pt still has droop to L side face, with numbness and tingling. Tele is SR.

## 2017-08-18 ENCOUNTER — TELEPHONE (OUTPATIENT)
Dept: FAMILY MEDICINE | Facility: CLINIC | Age: 57
End: 2017-08-18

## 2017-08-18 NOTE — TELEPHONE ENCOUNTER
"ED/Discharge Protocol    \"Hi, my name is Patricia JULIA Hanna, a registered nurse, and I am calling on behalf of Dr. Back's office at [place].  I am calling to follow up and see how things are going for you after your recent visit.\"    \"I see that you were in the (ER/UC/IP) on 8/16/2017 to 8/17/2017.    How are you doing now that you are home?\" Patient states he is doing okay.    Is patient experiencing symptoms that may require a hospital visit?  None    Discharge Instructions    \"Let's review your discharge instructions.  What is/are the follow-up recommendations?  Pt. Response: Follow up with PCP within one week    \"Were you instructed to make a follow-up appointment?\"  Pt. Response: Yes.  Has appointment been made?   Yes      \"When you see the provider, I would recommend that you bring your discharge instructions with you.    Medications    \"How many new medications are you on since your hospitalization/ED visit?\"    2 or more - Epic MTM referral needed  \"How many of your current medicines changed (dose, timing, name, etc.) while you were in the hospital/ED visit?\"   2 or more - Epic MTM referral needed  \"Do you have questions about your medications?\"   No  \"Were you newly diagnosed with heart failure, COPD, diabetes or did you have a heart attack?\"   No  For patients on insulin: \"Did you start on insulin in the hospital or did you have your insulin dose changed?\"   No    Medication reconciliation completed? Yes    Was MTM referral placed (*Make sure to put transitions as reason for referral)?   No    Call Summary    \"Do you have any questions or concerns about your condition or care plan at the moment?\"    No  Triage nurse advice given: Discharge summary recommendations reviewed.  Patient advised to follow up with TS as scheduled on 8/25/2017 and to schedule with neurology in 4 weeks.  Patient advised to call clinic with any questions or concerns prior to appointment.    Patient was in ER 0 in the past year " "(assess appropriateness of ER visits.)      \"If you have questions or things don't continue to improve, we encourage you contact us through the main clinic number,  [phone].  Even if the clinic is not open, triage nurses are available 24/7 to help you.     We would like you to know that our clinic has extended hours (provide information).  We also have urgent care (provide details on closest location and hours/contact info)\"      \"Thank you for your time and take care!\"       JILL Maldonado, RN, N   [facility]) [phone]        "

## 2017-08-18 NOTE — TELEPHONE ENCOUNTER
Patient discharged from [facility] for inpatient hospital stay on 8/17 for facial droop, bells palsy.    Please contact patient to follow up;  appointment scheduled with TS on 8/25.    ER / IP:  0/0    Care Coordination:  valeria Thibodeaux

## 2017-08-18 NOTE — TELEPHONE ENCOUNTER
ED / Discharge Outreach Protocol    Patient Contact    Attempt # 1    Was call answered?  No.  Left message on voicemail with information to call me back.  Mari Rivas RN

## 2017-08-25 ENCOUNTER — OFFICE VISIT (OUTPATIENT)
Dept: FAMILY MEDICINE | Facility: CLINIC | Age: 57
End: 2017-08-25
Payer: COMMERCIAL

## 2017-08-25 VITALS
HEART RATE: 74 BPM | WEIGHT: 223 LBS | TEMPERATURE: 98.5 F | BODY MASS INDEX: 33.8 KG/M2 | SYSTOLIC BLOOD PRESSURE: 132 MMHG | HEIGHT: 68 IN | OXYGEN SATURATION: 96 % | DIASTOLIC BLOOD PRESSURE: 86 MMHG

## 2017-08-25 DIAGNOSIS — I10 HYPERTENSION GOAL BP (BLOOD PRESSURE) < 140/90: ICD-10-CM

## 2017-08-25 DIAGNOSIS — G51.0 BELL'S PALSY: Primary | ICD-10-CM

## 2017-08-25 DIAGNOSIS — N18.2 CKD (CHRONIC KIDNEY DISEASE) STAGE 2, GFR 60-89 ML/MIN: ICD-10-CM

## 2017-08-25 DIAGNOSIS — Z13.6 CARDIOVASCULAR SCREENING; LDL GOAL LESS THAN 130: ICD-10-CM

## 2017-08-25 DIAGNOSIS — Z51.81 MEDICATION MONITORING ENCOUNTER: ICD-10-CM

## 2017-08-25 PROCEDURE — 99495 TRANSJ CARE MGMT MOD F2F 14D: CPT | Performed by: FAMILY MEDICINE

## 2017-08-25 NOTE — MR AVS SNAPSHOT
After Visit Summary   8/25/2017    Yannick Jean    MRN: [id number]           Patient Information     Date Of Birth          1960        Visit Information        Provider Department      8/25/2017 11:40 AM Maxwell Back MD [facility]        Today's Diagnoses     Bell's palsy    -  1    Hypertension goal BP (blood pressure) < 140/90        CKD (chronic kidney disease) stage 2, GFR 60-89 ml/min        CARDIOVASCULAR SCREENING; LDL GOAL LESS THAN 130        Medication monitoring encounter          Care Instructions    Continue with eye drops and Prednisone course, as directed    Follow up in 2 weeks      [facility]                        To reach your care team during and after hours:   [phone]  To reach our pharmacy:        [phone]    Clinic Hours                        Our clinic hours are:    Monday   7:30 am to 7:00 pm                  Tuesday through Friday 7:30 am to 5:00 pm                             Saturday   8:00 am to 12:00 pm      Sunday   Closed      Pharmacy Hours                        Our pharmacy hours are:    Monday   8:30 am to 7:00 pm       Tuesday to Friday  8:30 am to 6:00 pm                       Saturday    9:00 am to 1:00 pm              Sunday    Closed              There is also information available at our web site:  [email or web address]    If your provider ordered any lab tests and you do not receive the results within 10 business days, please call the clinic.    If you need a medication refill please contact your pharmacy.  Please allow 2-3 business days for your refill to be completed.    Our clinic offers telephone visits and e visits.  Please ask one of your team members to explain more.      Use Owlint (secure email communication and access to your chart) to send your primary care provider a message or make an appointment. Ask someone on your Team how to sign up for Owlint.  Immunizations                       Immunization History   Administered Date(s) Administered     Influenza (H1N1) 01/12/2010     Influenza (IIV3) 11/05/2008, 11/03/2009, 12/10/2010     TD (ADULT, 7+) 01/01/2005     TDAP Vaccine (Boostrix) 07/29/2015        Health Maintenance                         Health Maintenance Due   Topic Date Due     Colon Cancer Screening - FIT Test - yearly  07/18/1968     Hepatitis C Screening  07/18/1976     Cholesterol Lab - yearly  06/16/2010     Prostate Test (PSA) - yearly  04/01/2015               Follow-ups after your visit        Additional Services     NEUROLOGY ADULT REFERRAL       Your provider has referred you for the following:   Consult at [facility]: [facility] [phone] - Dr Beck Back    [email or web address]    Please be aware that coverage of these services is subject to the terms and limitations of your health insurance plan.  Call member services at your health plan with any benefit or coverage questions.      Please bring the following with you to your appointment:    (1) Any X-Rays, CTs or MRIs which have been performed.  Contact the facility where they were done to arrange for  prior to your scheduled appointment.    (2) List of current medications  (3) This referral request   (4) Any documents/labs given to you for this referral                  Who to contact     If you have questions or need follow up information about today's clinic visit or your schedule please contact [facility] directly at [phone].  Normal or non-critical lab and imaging results will be communicated to you by MyChart, letter or phone within 4 business days after the clinic has received the results. If you do not hear from us within 7 days, please contact the clinic through MyChart or phone. If you have a critical or abnormal lab result, we will notify you by phone as soon as possible.  Submit refill requests through walit or call  "your pharmacy and they will forward the refill request to us. Please allow 3 business days for your refill to be completed.          Additional Information About Your Visit        MyChart Information     [employer] lets you send messages to your doctor, view your test results, renew your prescriptions, schedule appointments and more. To sign up, go to [email or web address] . Click on \"Log in\" on the left side of the screen, which will take you to the Welcome page. Then click on \"Sign up Now\" on the right side of the page.     You will be asked to enter the access code listed below, as well as some personal information. Please follow the directions to create your username and password.     Your access code is: [email or web address]  Expires: 11/15/2017 10:25 AM     Your access code will  in 90 days. If you need help or a new code, please call your [facility] clinic or [phone].        Care EveryWhere ID     This is your [facility] EveryWhere ID. This could be used by other organizations to access your [facility] medical records  [id number]        Your Vitals Were     Pulse Temperature Height Pulse Oximetry BMI (Body Mass Index)       74 98.5  F (36.9  C) (Oral) 5' 8\" (1.727 m) 96% 33.91 kg/m2        Blood Pressure from Last 3 Encounters:   17 132/86   17 (!) 177/98   17 128/74    Weight from Last 3 Encounters:   17 223 lb (101.2 kg)   17 223 lb 9.6 oz (101.4 kg)   17 227 lb (103 kg)              We Performed the Following     NEUROLOGY ADULT REFERRAL        Primary Care Provider Office Phone # Fax #    Maxwell Back [phone]       [address] [place] [address]        Equal Access to Services     [facility] KILO : Eliezer Mark, len mccord, qarosita kasujit bowie, anthony vieira. [facility] [phone].    ATENCIÓN: Si habla español, tiene a keys disposición servicios gratuitos de asistencia lingüística. Llame al " [phone].    We comply with applicable federal civil rights laws and [place] laws. We do not discriminate on the basis of race, color, national origin, age, disability sex, sexual orientation or gender identity.            Thank you!     Thank you for choosing [facility]  for your care. Our goal is always to provide you with excellent care. Hearing back from our patients is one way we can continue to improve our services. Please take a few minutes to complete the written survey that you may receive in the mail after your visit with us. Thank you!             Your Updated Medication List - Protect others around you: Learn how to safely use, store and throw away your medicines at www.disposemymeds.org.          This list is accurate as of: 8/25/17 12:44 PM.  Always use your most recent med list.                   Brand Name Dispense Instructions for use Diagnosis    amLODIPine 5 MG tablet    NORVASC    30 tablet    Take 1 tablet (5 mg) by mouth daily    Hypertension goal BP (blood pressure) < 140/90       losartan-hydrochlorothiazide 50-12.5 MG per tablet    HYZAAR    90 tablet    TAKE ONE TABLET BY MOUTH DAILY    Hypertension goal BP (blood pressure) < 140/90       predniSONE 20 MG tablet    DELTASONE    34 tablet    Take 3 tablets (60 mg) by mouth daily for 7 days, then 2 tabs daily for 4 days, then 1 tab daily for 3 days, then 1/2 tab daily for 3 days    Bell's palsy

## 2017-08-25 NOTE — NURSING NOTE
"Chief Complaint   Patient presents with     ER F/U       Initial /86  Pulse 74  Temp 98.5  F (36.9  C) (Oral)  Ht 5' 8\" (1.727 m)  Wt 223 lb (101.2 kg)  SpO2 96%  BMI 33.91 kg/m2 Estimated body mass index is 33.91 kg/(m^2) as calculated from the following:    Height as of this encounter: 5' 8\" (1.727 m).    Weight as of this encounter: 223 lb (101.2 kg)..  BP completed using cuff size: brigette Mercedes MA  "

## 2017-08-25 NOTE — PROGRESS NOTES
SUBJECTIVE:   Yannick Jean is a 57 year old male who presents to clinic today for the following health issues:    [facility] Followup:  Facility:  [facility]  Date of visit: 8/16/2017  Reason for visit: Bell's Palsy  Current Status: left eye still won't blink and talking with lisp       Frankie reported feeling left lip numbness and a cut to his tongue the morning of his episode, and the next morning he was drooling. He complained of ongoing issues with not being able to blink his left eye, with associated dry eye at times - he has been using eye drops given by ED.     He stated that his lip/mouth function has improved since discharge.     He denied any involvement to his arms/legs.     He reported that he just finished Valtrex course, and he is taking Prednisone as directed by hospitalist.     HTN -- Amlodipine 5 mg daily, Losartan-HCTZ 50-12.5 mg. He reported that he has been checking BP at work ([employer]), and has been getting 125/-90.     BP Readings from Last 3 Encounters:   08/25/17 132/86   08/17/17 (!) 177/98   06/21/17 128/74     [facility] Note 8/16 --   Hospital Summary: Please refer to initial admission history and physical for further details.   Briefly, Yannick Jean was admitted on 8/16/2017 for complaints of facial droop on the left for 4 days concerning for CVA vs bells palsy.      Initial work up in the ED revealed a negative CT scan of the head for any acute process.   EKG did not reveal evidence of significant cardiac arrhythmias or ischemia.  Pt was registered to Observation for further evaluation.      Pt was placed on telemetry and underwent frequent neuro checks.   Pt was anticoagulated with ASA upon initial arrival.  Laboratory results were reviewed and significant findings addressed.   MRI/MRA of the brain and neck (with results listed below). No evidence of acute CVA was found and patient's history and exam are most consistent with bells palsy.  On the day of discharge, patient's symptoms  were stable, telemetry did not reveal any significant arrhythmias, vitals remained stable and pt was deemed safe for discharge. Medications were reviewed and adjustments made as necessary. Pt is instructed to follow up as below.        Problem list and histories reviewed & adjusted, as indicated.  Additional history: as documented    Reviewed and updated as needed this visit by clinical staff  Tobacco  Allergies  Meds  Med Hx  Surg Hx  Fam Hx  Soc Hx      Reviewed and updated as needed this visit by Provider         Wt Readings from Last 4 Encounters:   08/25/17 223 lb (101.2 kg)   08/16/17 223 lb 9.6 oz (101.4 kg)   06/21/17 227 lb (103 kg)   05/24/17 223 lb (101.2 kg)       Health Maintenance    Health Maintenance Due   Topic Date Due     FIT Q1 YR  07/18/1968     HEPATITIS C SCREENING  07/18/1976     LIPID MONITORING Q1 YEAR  06/16/2010     PSA Q1 YR  04/01/2015       Current Problem List    Patient Active Problem List   Diagnosis     ED (erectile dysfunction)     Hyperplastic colonic polyp     Hypertension goal BP (blood pressure) < 140/90     CARDIOVASCULAR SCREENING; LDL GOAL LESS THAN 130     Cervical pain     S/P cervical spinal fusion     Advanced directives, counseling/discussion     CKD (chronic kidney disease) stage 2, GFR 60-89 ml/min     Bell's palsy       Past Medical History    Past Medical History:   Diagnosis Date     Bell's palsy 08/2017     CARDIOVASCULAR SCREENING; LDL GOAL LESS THAN 130      Cervical stenosis of spine 5/15    central and foraminal - moderate to severe     CKD (chronic kidney disease) stage 2, GFR 60-89 ml/min 05/2017     Colon polyps, hyperplastic 6/09    x 4 - due 2019     ED (erectile dysfunction)     dr atkinson     Hypertension goal BP (blood pressure) < 140/90      Mixed hearing loss     Dr Lau     Prediabetes 05/2017     Tobacco use disorder     quit 1/09       Past Surgical History    Past Surgical History:   Procedure Laterality Date     COLONOSCOPY  6/09     hyperplastic - due 10 yrs     FUSION CERVICAL ANTERIOR THREE+ LEVELS N/A 2015    C3-6 FUSION CERVICAL ANTERIOR - Dr Morris     PE TUBES      PE tubes x 5     STRESS ECHO (METRO)      normal     SURGICAL HISTORY OF -       Rt Ear cholesteoma/tympanoplasty dr pretty     SURGICAL HISTORY OF -       dr patton - large lt foot ganglion cyst     TONSILLECTOMY & ADENOIDECTOMY  ,       Current Medications    Current Outpatient Prescriptions   Medication Sig Dispense Refill     predniSONE (DELTASONE) 20 MG tablet Take 3 tablets (60 mg) by mouth daily for 7 days, then 2 tabs daily for 4 days, then 1 tab daily for 3 days, then 1/2 tab daily for 3 days 34 tablet 0     amLODIPine (NORVASC) 5 MG tablet Take 1 tablet (5 mg) by mouth daily 30 tablet 0     losartan-hydrochlorothiazide (HYZAAR) 50-12.5 MG per tablet TAKE ONE TABLET BY MOUTH DAILY 90 tablet 1       Allergies    Allergies   Allergen Reactions     Penicillins        Immunizations    Immunization History   Administered Date(s) Administered     Influenza (H1N1) 2010     Influenza (IIV3) 2008, 2009, 12/10/2010     TD (ADULT, 7+) 2005     TDAP Vaccine (Boostrix) 2015       Family History    Family History   Problem Relation Age of Onset     Cardiovascular Father      First MI at 61, dies of MI at age 65     Respiratory Mother      Emphysema, COPD     Cardiovascular Maternal Grandmother       age approx 50 of MI       Social History    Social History     Social History     Marital status:      Spouse name: aguila     Number of children: 2     Years of education: 12     Occupational History      None      Social History Main Topics     Smoking status: Former Smoker     Packs/day: 2.00     Years: 32.00     Types: Cigarettes     Quit date: 2009     Smokeless tobacco: Never Used     Alcohol use No     Drug use: No     Sexual activity: Yes     Partners: Female     Other Topics Concern     Caffeine Concern Yes  "    1-2 cups qd, 5 cans daily     Exercise No     Seat Belt No     Social History Narrative       All above reviewed and updated, all stable unless otherwise noted    Recent labs reviewed    ROS:  Constitutional, HEENT, cardiovascular, pulmonary, GI, , musculoskeletal, neuro, skin, endocrine and psych systems are negative, except as in HPI or otherwise noted     This document serves as a record of the services and decisions personally performed and made by Maxwell Back MD [facility]. It was created on their behalf by Branden Lopez, a trained medical scribe. The creation of this document is based the provider's statements to the medical scribe.  Branden Lopez August 25, 2017 12:31 PM    OBJECTIVE:                                                    /86  Pulse 74  Temp 98.5  F (36.9  C) (Oral)  Ht 5' 8\" (1.727 m)  Wt 223 lb (101.2 kg)  SpO2 96%  BMI 33.91 kg/m2  Body mass index is 33.91 kg/(m^2).  GENERAL: healthy, alert and no distress, obese, left facial drooping  HENT: ear canals and TM's normal with left eustachian tube noted upon viewing with otoscope, nose and mouth without ulcers or lesions upon viewing with otoscope  RESP: lungs clear to auscultation - no rales, no rhonchi, no wheezes  CV: regular rates and rhythm, normal S1 S2, no S3 or S4 and no murmur, no click or rub -  MS: extremities- no gross deformities noted, no edema  SKIN: no suspicious lesions, no rashes to visible skin  NEURO: dysfunction to cranial nerves noted (left eyelid not able to close, left mouth droop, unable to puff cheeks), mentation intact and speech normal  PSYCH: Alert and oriented times 3; speech- coherent , normal rate and volume; able to articulate logical thoughts, able to abstract reason, no tangential thoughts, no hallucinations or delusions, affect- normal    DIAGNOSTICS/PROCEDURES:                                                      Results for orders placed or performed during the hospital encounter of 08/16/17   CT Head " w/o Contrast    Narrative    CT SCAN OF THE HEAD WITHOUT CONTRAST   8/16/2017 3:16 PM     HISTORY: Left facial droop since Sunday. Right-sided numbness. Unable  to close left eye. Weakness in right face and drooping right eyelid.    TECHNIQUE:  Axial images of the head and coronal reformations without  IV contrast material. Radiation dose for this scan was reduced using  automated exposure control, adjustment of the mA and/or kV according  to patient size, or iterative reconstruction technique.    COMPARISON: None.    FINDINGS:  The ventricles are normal in size, shape and configuration.   The brain parenchyma and subarachnoid spaces are normal. There is no  evidence of intracranial hemorrhage, mass, acute infarct or anomaly.     Previous canal wall down mastoidectomy is noted on the right.  Opacification in remaining right mastoid air cells. Left mastoid  appears normal. Visualized sinuses are clear. There is no evidence of  trauma.      Impression    IMPRESSION:   1. Normal CT scan of the brain.  2. Right-sided mastoidectomy.    ZAN BOB MD   MR Brain w/o & w Contrast    Narrative    MRI BRAIN WITHOUT AND WITH CONTRAST  8/16/2017 8:30 PM    HISTORY:  Left-sided facial weakness, rule out CVA.    TECHNIQUE:  Multiplanar, multisequence MRI of the brain without and  with 10 mL Gadavist.    COMPARISON: Head CT on same date.    FINDINGS: Diffusion-weighted images are normal. There is no evidence  for intracranial hemorrhage or any acute infarct. There are a few tiny  nonspecific white matter changes in both hemispheres without mass  effect or enhancement. Brain parenchyma is otherwise normal.  Ventricles and subarachnoid spaces are normal. Vascular structures are  patent at the skull base. Postcontrast images do not show any abnormal  areas of enhancement or any focal mass lesions. Fluid or inflammatory  changes are seen in both mastoid air cells. There is no evidence for  nasopharyngeal mass.      Impression     IMPRESSION:  1. No evidence for intracranial hemorrhage, acute infarct, or any  focal mass lesions.  2. A few tiny nonspecific white matter lesions.  3. Fluid or inflammatory changes in both mastoid air cells.    ANG BLEVINS MD   MRA Head w/o Contrast Angiogram    Narrative    MRA ANGIOGRAM HEAD W/O CONTRAST 8/16/2017 8:31 PM    HISTORY: Left-sided facial droop.    TECHNIQUE: 3D time-of-flight MR angiography was performed through the  [place] [facility] [place].    FINDINGS: The distal internal carotid arteries, basilar artery, and  proximal anterior, middle, and posterior cerebral arteries are patent.  There is no evidence for any large vessel occlusion or stenosis. There  is no evidence for a saccular aneurysm.      Impression    IMPRESSION: Negative MR angiography of the [place] [facility] [place].    ANG BLEVINS MD   Glucose by meter   Result Value Ref Range    Glucose 178 (H) 70 - 99 mg/dL   Creatinine POCT   Result Value Ref Range    Creatinine 1.0 0.66 - 1.25 mg/dL    GFR Estimate 76 >60 mL/min/1.7m2    GFR Estimate If Black >90 >60 mL/min/1.7m2   CBC with platelets differential   Result Value Ref Range    WBC 8.2 4.0 - 11.0 10e9/L    RBC Count 5.52 4.4 - 5.9 10e12/L    Hemoglobin 16.5 13.3 - 17.7 g/dL    Hematocrit 48.1 40.0 - 53.0 %    MCV 87 78 - 100 fl    MCH 29.9 26.5 - 33.0 pg    MCHC 34.3 31.5 - 36.5 g/dL    RDW 13.1 10.0 - 15.0 %    Platelet Count 276 150 - 450 10e9/L    Diff Method Automated Method     % Neutrophils 60.7 %    % Lymphocytes 29.0 %    % Monocytes 5.9 %    % Eosinophils 3.3 %    % Basophils 0.9 %    % Immature Granulocytes 0.2 %    Nucleated RBCs 0 0 /100    Absolute Neutrophil 5.0 1.6 - 8.3 10e9/L    Absolute Lymphocytes 2.4 0.8 - 5.3 10e9/L    Absolute Monocytes 0.5 0.0 - 1.3 10e9/L    Absolute Eosinophils 0.3 0.0 - 0.7 10e9/L    Absolute Basophils 0.1 0.0 - 0.2 10e9/L    Abs Immature Granulocytes 0.0 0 - 0.4 10e9/L    Absolute Nucleated RBC 0.0    Basic metabolic panel   Result Value Ref Range     Sodium 139 133 - 144 mmol/L    Potassium 3.8 3.4 - 5.3 mmol/L    Chloride 105 94 - 109 mmol/L    Carbon Dioxide 26 20 - 32 mmol/L    Anion Gap 8 3 - 14 mmol/L    Glucose 179 (H) 70 - 99 mg/dL    Urea Nitrogen 17 7 - 30 mg/dL    Creatinine 0.93 0.66 - 1.25 mg/dL    GFR Estimate 83 >60 mL/min/1.7m2    GFR Estimate If Black >90 >60 mL/min/1.7m2    Calcium 8.3 (L) 8.5 - 10.1 mg/dL   INR   Result Value Ref Range    INR 0.92 0.86 - 1.14   Partial thromboplastin time   Result Value Ref Range    PTT 36 22 - 37 sec   Hemoglobin A1c   Result Value Ref Range    Hemoglobin A1C 6.1 (H) 4.3 - 6.0 %   Glucose by meter   Result Value Ref Range    Glucose 112 (H) 70 - 99 mg/dL   Glucose by meter   Result Value Ref Range    Glucose 131 (H) 70 - 99 mg/dL   EKG 12-lead, tracing only   Result Value Ref Range    Interpretation ECG Click View Image link to view waveform and result         ASSESSMENT/PLAN:                                                        ICD-10-CM    1. Bell's palsy G51.0 NEUROLOGY ADULT REFERRAL   2. Hypertension goal BP (blood pressure) < 140/90 I10    3. CKD (chronic kidney disease) stage 2, GFR 60-89 ml/min N18.2    4. CARDIOVASCULAR SCREENING; LDL GOAL LESS THAN 130 Z13.6    5. Medication monitoring encounter Z51.81      Discussed treatment/modality options, including risk and benefits, he desires further health care maintenance, OTC meds (eye drops), referrals (neurology), observation, and Continue current medications (predisone, eye drops). All diagnosis above reviewed and noted above, otherwise stable.  See [facility] orders for further details.  Follow up in 2 weeks and as needed.    Pt advised to follow up with Neurology if needed.     Health Maintenance Due   Topic Date Due     FIT Q1 YR  07/18/1968     HEPATITIS C SCREENING  07/18/1976     LIPID MONITORING Q1 YEAR  06/16/2010     PSA Q1 YR  04/01/2015     Patient Instructions     Continue with eye drops and Prednisone course, as directed    Follow up in 2  weeks    The information in this document, created by the medical scribe for me, accurately reflects the services I personally performed and the decisions made by me. I have reviewed and approved this document for accuracy.   Maxwell Back MD FAAFP            Maxwell Back MD [address]  [phone] Fax

## 2017-08-25 NOTE — PATIENT INSTRUCTIONS
Continue with eye drops and Prednisone course, as directed    Follow up in 2 weeks      [facility]                        To reach your care team during and after hours:   [phone]  To reach our pharmacy:        [phone]    Clinic Hours                        Our clinic hours are:    Monday   7:30 am to 7:00 pm                  Tuesday through Friday 7:30 am to 5:00 pm                             Saturday   8:00 am to 12:00 pm      Sunday   Closed      Pharmacy Hours                        Our pharmacy hours are:    Monday   8:30 am to 7:00 pm       Tuesday to Friday  8:30 am to 6:00 pm                       Saturday    9:00 am to 1:00 pm              Sunday    Closed              There is also information available at our web site:  [email or web address]    If your provider ordered any lab tests and you do not receive the results within 10 business days, please call the clinic.    If you need a medication refill please contact your pharmacy.  Please allow 2-3 business days for your refill to be completed.    Our clinic offers telephone visits and e visits.  Please ask one of your team members to explain more.      Use [employer] (secure email communication and access to your chart) to send your primary care provider a message or make an appointment. Ask someone on your Team how to sign up for zumatek.  Immunizations                      Immunization History   Administered Date(s) Administered     Influenza (H1N1) 01/12/2010     Influenza (IIV3) 11/05/2008, 11/03/2009, 12/10/2010     TD (ADULT, 7+) 01/01/2005     TDAP Vaccine (Boostrix) 07/29/2015        Health Maintenance                         Health Maintenance Due   Topic Date Due     Colon Cancer Screening - FIT Test - yearly  07/18/1968     Hepatitis C Screening  07/18/1976     Cholesterol Lab - yearly  06/16/2010     Prostate Test (PSA) - yearly  04/01/2015

## 2017-09-08 ENCOUNTER — OFFICE VISIT (OUTPATIENT)
Dept: FAMILY MEDICINE | Facility: CLINIC | Age: 57
End: 2017-09-08
Payer: COMMERCIAL

## 2017-09-08 VITALS
BODY MASS INDEX: 34.1 KG/M2 | OXYGEN SATURATION: 97 % | DIASTOLIC BLOOD PRESSURE: 76 MMHG | WEIGHT: 225 LBS | SYSTOLIC BLOOD PRESSURE: 132 MMHG | HEIGHT: 68 IN | HEART RATE: 81 BPM | TEMPERATURE: 98.3 F

## 2017-09-08 DIAGNOSIS — I10 HYPERTENSION GOAL BP (BLOOD PRESSURE) < 140/90: ICD-10-CM

## 2017-09-08 DIAGNOSIS — Z51.81 MEDICATION MONITORING ENCOUNTER: ICD-10-CM

## 2017-09-08 DIAGNOSIS — G51.0 BELL'S PALSY: Primary | ICD-10-CM

## 2017-09-08 DIAGNOSIS — N18.2 CKD (CHRONIC KIDNEY DISEASE) STAGE 2, GFR 60-89 ML/MIN: ICD-10-CM

## 2017-09-08 DIAGNOSIS — Z13.6 CARDIOVASCULAR SCREENING; LDL GOAL LESS THAN 130: ICD-10-CM

## 2017-09-08 PROCEDURE — 99214 OFFICE O/P EST MOD 30 MIN: CPT | Performed by: FAMILY MEDICINE

## 2017-09-08 NOTE — PROGRESS NOTES
SUBJECTIVE:     9/8    Yannick Jean is a 57 year old male who presents to clinic today for follow up of hypertension treated with Amlodipine 5 mg, Bell's Palsy after a course of prednisone and use of eye drops. He reports significant improvement today. His facial droop is almost back to normal. He is able to drink from a straw and cup without leaking. He still has troubles with blinking but that too is improving. He has stop taking his amlodipine due to constipation.      BP Readings from Last 3 Encounters:   09/08/17 132/76   08/25/17 132/86   08/17/17 (!) 177/98     Wt Readings from Last 4 Encounters:   09/08/17 225 lb (102.1 kg)   08/25/17 223 lb (101.2 kg)   08/16/17 223 lb 9.6 oz (101.4 kg)   06/21/17 227 lb (103 kg)     8/25    [facility] Followup:  Facility:  [facility]  Date of visit: 8/16/2017  Reason for visit: Bell's Palsy  Current Status: left eye still won't blink and talking with lisp        Frankie reported feeling left lip numbness and a cut to his tongue the morning of his episode, and the next morning he was drooling. He complained of ongoing issues with not being able to blink his left eye, with associated dry eye at times - he has been using eye drops given by ED.      He stated that his lip/mouth function has improved since discharge.      He denied any involvement to his arms/legs.      He reported that he just finished Valtrex course, and he is taking Prednisone as directed by hospitalist.      HTN -- Amlodipine 5 mg daily, Losartan-HCTZ 50-12.5 mg. He reported that he has been checking BP at work ([employer]), and has been getting 125/-90.          BP Readings from Last 3 Encounters:   08/25/17 132/86   08/17/17 (!) 177/98   06/21/17 128/74      [facility] Note 8/16 --   Hospital Summary: Please refer to initial admission history and physical for further details.   Briefly, Yannick Jean was admitted on 8/16/2017 for complaints of facial droop on the left for 4 days concerning for CVA vs bells  palsy.       Initial work up in the ED revealed a negative CT scan of the head for any acute process.   EKG did not reveal evidence of significant cardiac arrhythmias or ischemia.  Pt was registered to Observation for further evaluation.      Pt was placed on telemetry and underwent frequent neuro checks.   Pt was anticoagulated with ASA upon initial arrival.  Laboratory results were reviewed and significant findings addressed.   MRI/MRA of the brain and neck (with results listed below). No evidence of acute CVA was found and patient's history and exam are most consistent with bells palsy.  On the day of discharge, patient's symptoms were stable, telemetry did not reveal any significant arrhythmias, vitals remained stable and pt was deemed safe for discharge. Medications were reviewed and adjustments made as necessary. Pt is instructed to follow up as below.      Health Maintenance    Health Maintenance Due   Topic Date Due     FIT Q1 YR  07/18/1968     HEPATITIS C SCREENING  07/18/1976     LIPID MONITORING Q1 YEAR  06/16/2010     PSA Q1 YR  04/01/2015     INFLUENZA VACCINE (SYSTEM ASSIGNED)  09/01/2017       Current Problem List    Patient Active Problem List   Diagnosis     ED (erectile dysfunction)     Hyperplastic colonic polyp     Hypertension goal BP (blood pressure) < 140/90     CARDIOVASCULAR SCREENING; LDL GOAL LESS THAN 130     Cervical pain     S/P cervical spinal fusion     Advanced directives, counseling/discussion     CKD (chronic kidney disease) stage 2, GFR 60-89 ml/min     Bell's palsy       Past Medical History    Past Medical History:   Diagnosis Date     Bell's palsy 08/2017     CARDIOVASCULAR SCREENING; LDL GOAL LESS THAN 130      Cervical stenosis of spine 5/15    central and foraminal - moderate to severe     CKD (chronic kidney disease) stage 2, GFR 60-89 ml/min 05/2017     Colon polyps, hyperplastic 6/09    x 4 - due 2019     ED (erectile dysfunction)     dr atkinson     Hypertension goal BP  (blood pressure) < 140/90      Mixed hearing loss     Dr Pretty     Prediabetes 2017     Tobacco use disorder     quit        Past Surgical History    Past Surgical History:   Procedure Laterality Date     COLONOSCOPY      hyperplastic - due 10 yrs     FUSION CERVICAL ANTERIOR THREE+ LEVELS N/A 2015    C3-6 FUSION CERVICAL ANTERIOR - Dr Morris     PE TUBES      PE tubes x 5     STRESS ECHO (METRO)      normal     SURGICAL HISTORY OF -       Rt Ear cholesteoma/tympanoplasty dr pretty     SURGICAL HISTORY OF -       dr patton - large lt foot ganglion cyst     TONSILLECTOMY & ADENOIDECTOMY  ,       Current Medications    Current Outpatient Prescriptions   Medication Sig Dispense Refill     aspirin 81 MG EC tablet Take 1 tablet (81 mg) by mouth daily 90 tablet 3     losartan-hydrochlorothiazide (HYZAAR) 50-12.5 MG per tablet TAKE ONE TABLET BY MOUTH DAILY 90 tablet 1       Allergies    Allergies   Allergen Reactions     Penicillins        Immunizations    Immunization History   Administered Date(s) Administered     Influenza (H1N1) 2010     Influenza (IIV3) 2008, 2009, 12/10/2010     TD (ADULT, 7+) 2005     TDAP Vaccine (Boostrix) 2015       Family History    Family History   Problem Relation Age of Onset     Cardiovascular Father      First MI at 61, dies of MI at age 65     Respiratory Mother      Emphysema, COPD     Cardiovascular Maternal Grandmother       age approx 50 of MI       Social History    Social History     Social History     Marital status:      Spouse name: aguila     Number of children: 2     Years of education: 12     Occupational History      None      Social History Main Topics     Smoking status: Former Smoker     Packs/day: 2.00     Years: 32.00     Types: Cigarettes     Quit date: 2009     Smokeless tobacco: Never Used     Alcohol use No     Drug use: No     Sexual activity: Yes     Partners: Female     Other  "Topics Concern     Caffeine Concern Yes     1-2 cups qd, 5 cans daily     Exercise No     Seat Belt No     Social History Narrative       All above reviewed and updated, all stable unless otherwise noted    Recent labs reviewed    ROS:  C: NEGATIVE for fever, chills, change in weight  I: NEGATIVE for worrisome rashes, moles or lesions  E: NEGATIVE for vision changes or irritation  E/M: NEGATIVE for ear, mouth and throat problems  R: NEGATIVE for significant cough or SOB  CV: NEGATIVE for chest pain, palpitations or peripheral edema  GI: NEGATIVE for nausea, abdominal pain, heartburn, or change in bowel habits  : NEGATIVE for frequency, dysuria, or hematuria  M: NEGATIVE for significant arthralgias or myalgia  N: NEGATIVE for weakness, dizziness or paresthesias  E: NEGATIVE for temperature intolerance, skin/hair changes  H: NEGATIVE for bleeding problems  P: NEGATIVE for changes in mood or affect    OBJECTIVE:                                                    /76  Pulse 81  Temp 98.3  F (36.8  C) (Oral)  Ht 5' 8\" (1.727 m)  Wt 225 lb (102.1 kg)  SpO2 97%  BMI 34.21 kg/m2  Body mass index is 34.21 kg/(m^2).  GENERAL: healthy, alert and no distress  EYES: Eyes grossly normal to inspection, extraocular movements - intact, and PERRL  HENT: ear canals- normal; TMs- normal; Nose- normal; Mouth- no ulcers, no lesions  NECK: no tenderness, no adenopathy, no asymmetry, no masses, no stiffness; thyroid- normal to palpation  RESP: lungs clear to auscultation - no rales, no rhonchi, no wheezes  CV: regular rates and rhythm, normal S1 S2, no S3 or S4 and no murmur, no click or rub -  ABDOMEN: soft, no tenderness, no  hepatosplenomegaly, no masses, normal bowel sounds  MS: extremities- no gross deformities noted, no edema  SKIN: no suspicious lesions, no rashes  NEURO: strength and tone- normal, sensory exam- grossly normal, mentation- intact, speech- normal, reflexes- symmetric - much improved starting to blink, " able to close eyes tightly, able to blow a bubble  BACK: no CVA tenderness, no paralumbar tenderness  PSYCH: Alert and oriented times 3; speech- coherent , normal rate and volume; able to articulate logical thoughts, able to abstract reason, no tangential thoughts, no hallucinations or delusions, affect- normal.     ASSESSMENT/PLAN:                                                        ICD-10-CM    1. Bell's palsy G51.0    2. CKD (chronic kidney disease) stage 2, GFR 60-89 ml/min N18.2    3. Hypertension goal BP (blood pressure) < 140/90 I10    4. CARDIOVASCULAR SCREENING; LDL GOAL LESS THAN 130 Z13.6    5. Medication monitoring encounter Z51.81        Discussed treatment/modality options, including risk and benefits, significant improvement to date, he was advised to continue with the eyedrops and return for a physical in 1-3 months, sooner if increased symptoms. Since his BP was acceptable, he will not start taking the amlodipine unless his BP becomes elevated. Should he need to start his amlodipine again, we discussed lowering the dose to avoid constipation.      Health Maintenance Due   Topic Date Due     FIT Q1 YR  07/18/1968     HEPATITIS C SCREENING  07/18/1976     LIPID MONITORING Q1 YEAR  06/16/2010     PSA Q1 YR  04/01/2015     INFLUENZA VACCINE (SYSTEM ASSIGNED)  09/01/2017       Warren Sol MS3 acted as a scribe for me today and accurately reflected my words and actions.    I agree with above History, Review of Systems, Physical exam and Plan.  I have reviewed the content of the documentation and have edited it as needed. I have personally performed the services documented here and the documentation accurately represents those services and the decisions I have made.            Maxwell Back M.D.

## 2017-09-08 NOTE — MR AVS SNAPSHOT
After Visit Summary   9/8/2017    Yannick Jean    MRN: [id number]           Patient Information     Date Of Birth          1960        Visit Information        Provider Department      9/8/2017 11:40 AM Maxwell Back MD [facility]        Today's Diagnoses     Bell's palsy    -  1    CKD (chronic kidney disease) stage 2, GFR 60-89 ml/min        Hypertension goal BP (blood pressure) < 140/90        CARDIOVASCULAR SCREENING; LDL GOAL LESS THAN 130        Medication monitoring encounter          Care Instructions        [facility]                        To reach your care team during and after hours:   [phone]  To reach our pharmacy:        [phone]    Clinic Hours                        Our clinic hours are:    Monday   7:30 am to 7:00 pm                  Tuesday through Friday 7:30 am to 5:00 pm                             Saturday   8:00 am to 12:00 pm      Sunday   Closed      Pharmacy Hours                        Our pharmacy hours are:    Monday   8:30 am to 7:00 pm       Tuesday to Friday  8:30 am to 6:00 pm                       Saturday    9:00 am to 1:00 pm              Sunday    Closed              There is also information available at our web site:  [email or web address]    If your provider ordered any lab tests and you do not receive the results within 10 business days, please call the clinic.    If you need a medication refill please contact your pharmacy.  Please allow 2-3 business days for your refill to be completed.    Our clinic offers telephone visits and e visits.  Please ask one of your team members to explain more.      Use [employer] (secure email communication and access to your chart) to send your primary care provider a message or make an appointment. Ask someone on your Team how to sign up for [employer].  Immunizations                      Immunization History   Administered Date(s) Administered     Influenza (H1N1) 01/12/2010      "Influenza (IIV3) 2008, 2009, 12/10/2010     TD (ADULT, 7+) 2005     TDAP Vaccine (Boostrix) 2015        Health Maintenance                         Health Maintenance Due   Topic Date Due     Colon Cancer Screening - FIT Test - yearly  1968     Hepatitis C Screening  1976     Cholesterol Lab - yearly  2010     Prostate Test (PSA) - yearly  2015     Flu Vaccine - yearly  2017               Follow-ups after your visit        Who to contact     If you have questions or need follow up information about today's clinic visit or your schedule please contact [facility] directly at [phone].  Normal or non-critical lab and imaging results will be communicated to you by MyChart, letter or phone within 4 business days after the clinic has received the results. If you do not hear from us within 7 days, please contact the clinic through [employer] or phone. If you have a critical or abnormal lab result, we will notify you by phone as soon as possible.  Submit refill requests through [employer] or call your pharmacy and they will forward the refill request to us. Please allow 3 business days for your refill to be completed.          Additional Information About Your Visit        [employer] Information     [facility] lets you send messages to your doctor, view your test results, renew your prescriptions, schedule appointments and more. To sign up, go to [email or web address] . Click on \"Log in\" on the left side of the screen, which will take you to the Welcome page. Then click on \"Sign up Now\" on the right side of the page.     You will be asked to enter the access code listed below, as well as some personal information. Please follow the directions to create your username and password.     Your access code is: [email or web address]  Expires: 11/15/2017 10:25 AM     Your access code will  in 90 days. If you need help or a new code, please call your [place] clinic or " "[phone].        Care EveryWhere ID     This is your Care EveryWhere ID. This could be used by other organizations to access your [place] medical records  [id number]        Your Vitals Were     Pulse Temperature Height Pulse Oximetry BMI (Body Mass Index)       81 98.3  F (36.8  C) (Oral) 5' 8\" (1.727 m) 97% 34.21 kg/m2        Blood Pressure from Last 3 Encounters:   09/08/17 132/76   08/25/17 132/86   08/17/17 (!) 177/98    Weight from Last 3 Encounters:   09/08/17 225 lb (102.1 kg)   08/25/17 223 lb (101.2 kg)   08/16/17 223 lb 9.6 oz (101.4 kg)              Today, you had the following     No orders found for display         Today's Medication Changes          These changes are accurate as of: 9/8/17  1:35 PM.  If you have any questions, ask your nurse or doctor.               Stop taking these medicines if you haven't already. Please contact your care team if you have questions.     amLODIPine 5 MG tablet   Commonly known as:  NORVASC   Stopped by:  Maxwell Back MD                    Primary Care Provider Office Phone # Fax #    Maxwell Back [phone] [address] [facility] [address]        Equal Access to Services     [facility] AH: Hadii shelby ku hadasho Soomaali, waaxda luqadaha, qaybta kaalmada adeegyada, anthony marks hayrico clemons . So [facility] [phone].    ATENCIÓN: Si habla español, tiene a keys disposición servicios gratuitos de asistencia lingüística. Llame al [phone].    We comply with applicable federal civil rights laws and [place] laws. We do not discriminate on the basis of race, color, national origin, age, disability sex, sexual orientation or gender identity.            Thank you!     Thank you for choosing [facility]  for your care. Our goal is always to provide you with excellent care. Hearing back from our patients is one way we can continue to improve our services. Please take a few minutes to complete the written " survey that you may receive in the mail after your visit with us. Thank you!             Your Updated Medication List - Protect others around you: Learn how to safely use, store and throw away your medicines at www.disposemymeds.org.          This list is accurate as of: 9/8/17  1:35 PM.  Always use your most recent med list.                   Brand Name Dispense Instructions for use Diagnosis    aspirin 81 MG EC tablet     90 tablet    Take 1 tablet (81 mg) by mouth daily        losartan-hydrochlorothiazide 50-12.5 MG per tablet    HYZAAR    90 tablet    TAKE ONE TABLET BY MOUTH DAILY    Hypertension goal BP (blood pressure) < 140/90

## 2017-09-08 NOTE — PATIENT INSTRUCTIONS
[facility]                        To reach your care team during and after hours:   [phone]  To reach our pharmacy:        [phone]    Clinic Hours                        Our clinic hours are:    Monday   7:30 am to 7:00 pm                  Tuesday through Friday 7:30 am to 5:00 pm                             Saturday   8:00 am to 12:00 pm      Sunday   Closed      Pharmacy Hours                        Our pharmacy hours are:    Monday   8:30 am to 7:00 pm       Tuesday to Friday  8:30 am to 6:00 pm                       Saturday    9:00 am to 1:00 pm              Sunday    Closed              There is also information available at our web site:  [email or web address]    If your provider ordered any lab tests and you do not receive the results within 10 business days, please call the clinic.    If you need a medication refill please contact your pharmacy.  Please allow 2-3 business days for your refill to be completed.    Our clinic offers telephone visits and e visits.  Please ask one of your team members to explain more.      Use [employer] (secure email communication and access to your chart) to send your primary care provider a message or make an appointment. Ask someone on your Team how to sign up for [employer].  Immunizations                      Immunization History   Administered Date(s) Administered     Influenza (H1N1) 01/12/2010     Influenza (IIV3) 11/05/2008, 11/03/2009, 12/10/2010     TD (ADULT, 7+) 01/01/2005     TDAP Vaccine (Boostrix) 07/29/2015        Health Maintenance                         Health Maintenance Due   Topic Date Due     Colon Cancer Screening - FIT Test - yearly  07/18/1968     Hepatitis C Screening  07/18/1976     Cholesterol Lab - yearly  06/16/2010     Prostate Test (PSA) - yearly  04/01/2015     Flu Vaccine - yearly  09/01/2017

## 2018-01-29 DIAGNOSIS — I10 HYPERTENSION GOAL BP (BLOOD PRESSURE) < 140/90: ICD-10-CM

## 2018-01-29 RX ORDER — LOSARTAN POTASSIUM AND HYDROCHLOROTHIAZIDE 12.5; 5 MG/1; MG/1
TABLET ORAL
Qty: 90 TABLET | Refills: 1 | Status: SHIPPED | OUTPATIENT
Start: 2018-01-29 | End: 2018-08-12

## 2018-01-29 NOTE — TELEPHONE ENCOUNTER
losartan-hydrochlorothiazide (HYZAAR) 50-12.5 MG per tablet 90 tablet 1 7/19/2017  No   Sig: TAKE ONE TABLET BY MOUTH DAILY       [place] [email or web address] or [facility] prescribing provider: 9/8/2017       Potassium   Date Value Ref Range Status   08/16/2017 3.8 3.4 - 5.3 mmol/L Final     Comment:     Specimen slightly hemolyzed, potassium may be falsely elevated  Results confirmed by repeat test       Creatinine   Date Value Ref Range Status   08/16/2017 0.93 0.66 - 1.25 mg/dL Final     BP Readings from Last 3 Encounters:   09/08/17 132/76   08/25/17 132/86   08/17/17 (!) 177/98     Refill per RN protocol     Rita Gilmore RN, BSN  [facility]

## 2018-06-14 ENCOUNTER — TRANSFERRED RECORDS (OUTPATIENT)
Dept: HEALTH INFORMATION MANAGEMENT | Facility: CLINIC | Age: 58
End: 2018-06-14

## 2018-08-12 DIAGNOSIS — I10 HYPERTENSION GOAL BP (BLOOD PRESSURE) < 140/90: ICD-10-CM

## 2018-08-13 RX ORDER — LOSARTAN POTASSIUM AND HYDROCHLOROTHIAZIDE 12.5; 5 MG/1; MG/1
TABLET ORAL
Qty: 90 TABLET | Refills: 0 | Status: SHIPPED | OUTPATIENT
Start: 2018-08-13 | End: 2018-09-17

## 2018-08-13 NOTE — TELEPHONE ENCOUNTER
Medication is being filled for 1 time refill only due to:  Patient needs to be seen because due for yearly OV soon.   Mari Rivas RN  [facility]

## 2018-09-17 ENCOUNTER — OFFICE VISIT (OUTPATIENT)
Dept: FAMILY MEDICINE | Facility: CLINIC | Age: 58
End: 2018-09-17
Payer: COMMERCIAL

## 2018-09-17 VITALS
HEART RATE: 69 BPM | BODY MASS INDEX: 35.01 KG/M2 | TEMPERATURE: 98.9 F | WEIGHT: 231 LBS | HEIGHT: 68 IN | OXYGEN SATURATION: 95 % | DIASTOLIC BLOOD PRESSURE: 96 MMHG | SYSTOLIC BLOOD PRESSURE: 154 MMHG

## 2018-09-17 DIAGNOSIS — R41.3 MEMORY PROBLEM: ICD-10-CM

## 2018-09-17 DIAGNOSIS — Z23 NEED FOR PNEUMOCOCCAL VACCINATION: ICD-10-CM

## 2018-09-17 DIAGNOSIS — N18.2 CKD (CHRONIC KIDNEY DISEASE) STAGE 2, GFR 60-89 ML/MIN: ICD-10-CM

## 2018-09-17 DIAGNOSIS — Z51.81 MEDICATION MONITORING ENCOUNTER: ICD-10-CM

## 2018-09-17 DIAGNOSIS — R73.03 PREDIABETES: Primary | ICD-10-CM

## 2018-09-17 DIAGNOSIS — E78.5 HYPERLIPIDEMIA LDL GOAL <100: ICD-10-CM

## 2018-09-17 DIAGNOSIS — I10 HYPERTENSION GOAL BP (BLOOD PRESSURE) < 140/90: ICD-10-CM

## 2018-09-17 DIAGNOSIS — E66.01 MORBID OBESITY (H): ICD-10-CM

## 2018-09-17 PROCEDURE — 99214 OFFICE O/P EST MOD 30 MIN: CPT | Mod: 25 | Performed by: FAMILY MEDICINE

## 2018-09-17 PROCEDURE — 90471 IMMUNIZATION ADMIN: CPT | Performed by: FAMILY MEDICINE

## 2018-09-17 PROCEDURE — 90732 PPSV23 VACC 2 YRS+ SUBQ/IM: CPT | Performed by: FAMILY MEDICINE

## 2018-09-17 RX ORDER — LOSARTAN POTASSIUM AND HYDROCHLOROTHIAZIDE 12.5; 5 MG/1; MG/1
1 TABLET ORAL DAILY
Qty: 90 TABLET | Refills: 3 | Status: SHIPPED | OUTPATIENT
Start: 2018-09-17 | End: 2019-01-18

## 2018-09-17 NOTE — PROGRESS NOTES
"  SUBJECTIVE:   Yannick Jean is a 58 year old male who presents to clinic today for the following health issues:    Hypertension Follow-up      Outpatient blood pressures are not being checked, but last measurement that the patient can remember is around 130/82     Low Salt Diet: not monitoring salt      Amount of exercise or physical activity: None    Problems taking medications regularly: No - takes losartan-HCTZ    Medication side effects: none    Diet: regular (no restrictions)    Lab Results   Component Value Date    CR 0.93 08/16/2017     BP Readings from Last 3 Encounters:   09/17/18 (!) 154/96   09/08/17 132/76   08/25/17 132/86      The patient drove 16hr yesterday back from [place] and got in at 3:00am this morning. He wonders if this may be affecting his blood pressure today.    Math/Memory Concerns: The patient is concerned about this - he works as  at [employer] where he uses math every day. He used to be really good at math (doing complex calculations like squares and square roots in his head) but over the past 5-6 months he has intermittantly noticed that isn't able to comprehend measurements or calculations as readily as he used to (\"numbers were jumbled\" on one occasion, and also in June noticed that he couldn't reframe a door which he is usually able to). He is usually really good at this but now needs to use a calculator more often. He hasn't noticed any trouble since June. He feels that his short term memory is not as good as it used to be (for example, he sometimes forgets where he sets things down and has been forgetting words/phrases more frequently). He denies a family history of Alzheimer's or dementia.    Right Back Pain:     Site: Right mid-back; none in middle lower back    Timing: constant low level with intermittent increased pain    Exacerbating Factors: playing golf - pain comes back after he has been playing for a while (repetative golf swings)    Bell's Palsy: " "8/2017. He hasn't had any symptoms since last year. Did notice increased \"eye junk\" in his left eye recently (similar to his symptoms last year) but wonders if it is related to driving for a long time on a motorcycle and/or the very humid [place] summer air. He currently does not have symptoms - no concerns. No history of seasonal allergies.     CKD:  Creatinine   Date Value Ref Range Status   08/16/2017 0.93 0.66 - 1.25 mg/dL Final     Hx Cervical Spinal Fusion: some chronic low-level symptoms but no concerns today.    Prediabetes: The patient says that his previous two blood glucoses have been 104 (last year) and 106 (this year) after fasting. He gets labs checked through his work but wasn't able to find the results today.    Hemoglobin A1C   Date Value Ref Range Status   08/17/2017 6.1 (H) 4.3 - 6.0 % Final       Hearing: Hx of right ear cholesteoma/tympanoplasty 12/2008. The patient says that his hearing is bad in both ears (has some trouble with tones) but has no concerns today.     Cigarettes: quit 2009, has not used tobacco since then    Flu shot: will get at work for free    Reviewed and updated as needed this visit by clinical staff  Tobacco  Allergies  Meds  Med Hx  Surg Hx  Fam Hx  Soc Hx      Reviewed and updated as needed this visit by Provider       body mass index is 35.12 kg/(m^2).    Wt Readings from Last 4 Encounters:   09/17/18 231 lb (104.8 kg)   09/08/17 225 lb (102.1 kg)   08/25/17 223 lb (101.2 kg)   08/16/17 223 lb 9.6 oz (101.4 kg)       Health Maintenance    Health Maintenance Due   Topic Date Due     FIT Q1 YR  07/18/1968     HIV SCREEN (SYSTEM ASSIGNED)  07/18/1976     HEPATITIS C SCREENING  07/18/1976     LIPID MONITORING Q1 YEAR  06/16/2010     PSA Q1 YR  04/01/2015     PHQ-2 Q1 YR  03/30/2017     MICROALBUMIN Q1 YEAR  05/17/2018     BMP Q1 YR  08/16/2018     INFLUENZA VACCINE (1) 09/01/2018       Current Problem List    Patient Active Problem List   Diagnosis     ED (erectile " dysfunction)     Hyperplastic colonic polyp     Hypertension goal BP (blood pressure) < 140/90     Cervical pain     S/P cervical spinal fusion     Advanced directives, counseling/discussion     CKD (chronic kidney disease) stage 2, GFR 60-89 ml/min     Bell's palsy     Prediabetes     Hyperlipidemia LDL goal <100       Past Medical History    Past Medical History:   Diagnosis Date     Bell's palsy 08/2017     Cervical stenosis of spine 5/15    central and foraminal - moderate to severe     CKD (chronic kidney disease) stage 2, GFR 60-89 ml/min 05/2017     Colon polyps, hyperplastic 6/09    x 4 - due 2019     ED (erectile dysfunction)     dr atkinson     Hyperlipidemia LDL goal <100 9/17/2018     Hypertension goal BP (blood pressure) < 140/90      Mixed hearing loss     Dr Pretty     Prediabetes 05/2017     Tobacco use disorder     quit 1/09       Past Surgical History    Past Surgical History:   Procedure Laterality Date     COLONOSCOPY  6/09    hyperplastic - due 10 yrs     FUSION CERVICAL ANTERIOR THREE+ LEVELS N/A 7/30/2015    C3-6 FUSION CERVICAL ANTERIOR - Dr Morris     PE TUBES      PE tubes x 5     STRESS ECHO ([facility])  1/08    normal     SURGICAL HISTORY OF -   12/08    Rt Ear cholesteoma/tympanoplasty dr pretty     SURGICAL HISTORY OF -   7/09    dr patton - large lt foot ganglion cyst     TONSILLECTOMY & ADENOIDECTOMY  1963,1989       Current Medications    Current Outpatient Prescriptions   Medication Sig Dispense Refill     aspirin 81 MG EC tablet Take 1 tablet (81 mg) by mouth daily 90 tablet 3     losartan-hydrochlorothiazide (HYZAAR) 50-12.5 MG per tablet Take 1 tablet by mouth daily 90 tablet 3       Allergies    Allergies   Allergen Reactions     Penicillins        Immunizations    Immunization History   Administered Date(s) Administered     Influenza (H1N1) 01/12/2010     Influenza (IIV3) PF 11/05/2008, 11/03/2009, 12/10/2010     Pneumococcal 23 valent 09/17/2018     TD (ADULT, 7+) 01/01/2005      "TDAP Vaccine (Boostrix) 2015     Zoster vaccine recombinant adjuvanted (SHINGRIX) 2018       Family History    Family History   Problem Relation Age of Onset     Cardiovascular Father      First MI at 61, dies of MI at age 65     Respiratory Mother      Emphysema, COPD     Cardiovascular Maternal Grandmother       age approx 50 of MI       Social History    Social History     Social History     Marital status:      Spouse name: aguila     Number of children: 2     Years of education: 12     Occupational History      None      Social History Main Topics     Smoking status: Former Smoker     Packs/day: 2.00     Years: 32.00     Types: Cigarettes     Quit date: 2009     Smokeless tobacco: Never Used     Alcohol use No     Drug use: No     Sexual activity: Yes     Partners: Female     Other Topics Concern     Caffeine Concern Yes     1-2 cups qd, 5 cans daily     Exercise No     Seat Belt No     Social History Narrative       All above reviewed and updated, all stable unless otherwise noted    Recent labs reviewed    ROS:  Constitutional, HEENT, cardiovascular, pulmonary, GI, , musculoskeletal, neuro, skin, endocrine and psych systems are negative, except as otherwise noted.   OBJECTIVE:                                                    BP (!) 154/96  Pulse 69  Temp 98.9  F (37.2  C) (Oral)  Ht 5' 8\" (1.727 m)  Wt 231 lb (104.8 kg)  SpO2 95%  BMI 35.12 kg/m2  Body mass index is 35.12 kg/(m^2).  GENERAL: healthy, alert and no distress  EYES: Eyes grossly normal to inspection, extraocular movements grossly normal  HENT: bilateral hearing aids. Tympanostomy tube in left ear canal - no longer functional. Otherwise ear canals- normal; TMs- normal; Nose- normal externally; Mouth - no lesions  RESP: lungs clear to auscultation - no rales, no rhonchi, no wheezes  CV: regular rates and rhythm, normal S1 S2, no S3 or S4 and no murmur, no click or rub, no peripheral edema  ABDOMEN: soft, no " tenderness, no  hepatosplenomegaly, no masses, normal bowel sounds  MS: full ROM of lumbar spine with respect to flexion, extension, rotation, and lateral flexion; extremities- no gross deformities noted  NEURO: strength and tone- grossly normal, sensory exam- grossly normal, mentation- intact, speech- normal, reflexes- symmetric  BACK: no CVA tenderness, no paralumbar tenderness  PSYCH: speech- coherent , normal rate and volume; able to articulate logical thoughts, able to abstract reason, no tangential thoughts, no hallucinations or delusions, affect- normal  LYMPHATICS: ant. cervical- normal, post. cervical- normal, supraclavicular- normal    DIAGNOSTICS/PROCEDURES:                                                      None     ASSESSMENT/PLAN:                                                        ICD-10-CM    1. Prediabetes R73.03 Comprehensive metabolic panel     Hemoglobin A1c     Albumin Random Urine Quantitative with Creat Ratio     *UA reflex to Microscopic and Culture ([facility] (except [place] and [place])   2. CKD (chronic kidney disease) stage 2, GFR 60-89 ml/min N18.2 Comprehensive metabolic panel     Hemoglobin A1c     Albumin Random Urine Quantitative with Creat Ratio     *UA reflex to Microscopic and Culture ([facility] (except [place] and [place])   3. Hypertension goal BP (blood pressure) < 140/90 I10 Comprehensive metabolic panel     Hemoglobin A1c     Albumin Random Urine Quantitative with Creat Ratio     *UA reflex to Microscopic and Culture ([facility] (except [place] and [place])     losartan-hydrochlorothiazide (HYZAAR) 50-12.5 MG per tablet   4. Hyperlipidemia LDL goal <100 E78.5 Comprehensive metabolic panel     Lipid panel reflex to direct LDL Fasting     CK total   5. Memory problem R41.3 NEUROLOGY ADULT REFERRAL   6. Morbid obesity (H) E66.01    7. Medication monitoring encounter Z51.81 Comprehensive metabolic panel     Lipid  panel reflex to direct LDL Fasting     CK total     CBC with platelets     Hemoglobin A1c     Albumin Random Urine Quantitative with Creat Ratio     *UA reflex to Microscopic and Culture ([facility] [facility] (except [place] and [place])     Fecal colorectal cancer screen (FIT)   8. Need for pneumococcal vaccination Z23 PNEUMOCOCCAL VACCINE,ADULT,SQ OR IM     ADMIN 1st VACCINE     Discussed treatment/modality options, including risk and benefits, he desires diet, exercise, weight loss, watch BP, labs, neurology, pneumovax. All diagnosis above reviewed and noted above, otherwise stable.  See EpicCare orders for further details.      Return in about 1 month (around 10/17/2018), or if symptoms worsen or fail to improve, for Complete Physical.    Health Maintenance Due   Topic Date Due     FIT Q1 YR  07/18/1968     HIV SCREEN (SYSTEM ASSIGNED)  07/18/1976     HEPATITIS C SCREENING  07/18/1976     LIPID MONITORING Q1 YEAR  06/16/2010     PSA Q1 YR  04/01/2015     PHQ-2 Q1 YR  03/30/2017     MICROALBUMIN Q1 YEAR  05/17/2018     BMP Q1 YR  08/16/2018     INFLUENZA VACCINE (1) 09/01/2018     1) Future labs ordered. Immunizations today: pneumococcal 23 valent; the patient gets his flu shot for free at work    2) Offered to place a neuropsych evaluation referral for the patient's memory concern today; patient will consider in the future and call as needed.    3) Suspect that the patient's back pain is related to muscle strain. Normal back exam today. Recommended rest, stretching, ice as needed.    Discussed treatment/modality options, including risk and benefits, he desires to follow the treatment plan outlined above. All diagnosis above reviewed and noted above, otherwise stable.  See EpicCare orders for further details.  Follow up as needed.    Return in about 1 month (around 10/17/2018), or if symptoms worsen or fail to improve, for Complete Physical.    Health Maintenance Due   Topic Date Due     FIT Q1 YR   07/18/1968     HIV SCREEN (SYSTEM ASSIGNED)  07/18/1976     HEPATITIS C SCREENING  07/18/1976     LIPID MONITORING Q1 YEAR  06/16/2010     PSA Q1 YR  04/01/2015     PHQ-2 Q1 YR  03/30/2017     MICROALBUMIN Q1 YEAR  05/17/2018     BMP Q1 YR  08/16/2018     INFLUENZA VACCINE (1) 09/01/2018     Cr Mishra, MS3 Student, has participated in the care of this patient.     Provider Disclosure:  I agree with above History, Review of Systems, Physical exam and Plan.  I have reviewed the content of the documentation and have edited it as needed. I have personally performed the services documented here and the documentation accurately represents those services and the decisions I have made.      Electronically signed by:        Maxwell Back M.D.   [address]  [id number] [phone] Fax

## 2018-09-17 NOTE — PATIENT INSTRUCTIONS
[facility]                        To reach your care team during and after hours:   [phone]  To reach our pharmacy:        [phone]    Clinic Hours                        Our clinic hours are:    Monday   7:30 am to 7:00 pm                  Tuesday through Friday 7:30 am to 5:00 pm                             Saturday   8:00 am to 12:00 pm      Sunday   Closed      Pharmacy Hours                        Our pharmacy hours are:    Monday   8:30 am to 7:00 pm       Tuesday to Friday  8:30 am to 6:00 pm                       Saturday    9:00 am to 1:00 pm              Sunday    Closed              There is also information available at our web site:  [email or web address]    If your provider ordered any lab tests and you do not receive the results within 10 business days, please call the clinic.    If you need a medication refill please contact your pharmacy.  Please allow 2-3 business days for your refill to be completed.    Our clinic offers telephone visits and e visits.  Please ask one of your team members to explain more.      Use Jottt (secure email communication and access to your chart) to send your primary care provider a message or make an appointment. Ask someone on your Team how to sign up for [employer].  Immunizations                      Immunization History   Administered Date(s) Administered     Influenza (H1N1) 01/12/2010     Influenza (IIV3) PF 11/05/2008, 11/03/2009, 12/10/2010     TD (ADULT, 7+) 01/01/2005     TDAP Vaccine (Boostrix) 07/29/2015        Health Maintenance                         Health Maintenance Due   Topic Date Due     Colon Cancer Screening - FIT Test - yearly  07/18/1968     HIV SCREEN (SYSTEM ASSIGNED)  07/18/1976     Hepatitis C Screening  07/18/1976     Cholesterol Lab - yearly  06/16/2010     Prostate Test (PSA) - yearly  04/01/2015     Depression Assessment 2 - yearly  03/30/2017     Microalbumin Lab - yearly  05/17/2018     Basic Metabolic Lab -  yearly  08/16/2018     Flu Vaccine (1) 09/01/2018     Pneumonia Vaccine (1) 08/18/2018

## 2018-09-17 NOTE — MR AVS SNAPSHOT
After Visit Summary   9/17/2018    Yannick Jean    MRN: [id number]           Patient Information     Date Of Birth          1960        Visit Information        Provider Department      9/17/2018 10:20 AM Maxwell Back MD [facility]        Today's Diagnoses     Prediabetes    -  1    CKD (chronic kidney disease) stage 2, GFR 60-89 ml/min        Hypertension goal BP (blood pressure) < 140/90        Hyperlipidemia LDL goal <100        Memory problem        Morbid obesity (H)        Medication monitoring encounter        Need for pneumococcal vaccination          Care Instructions        [facility]                        To reach your care team during and after hours:   [phone]  To reach our pharmacy:        [phone]    Clinic Hours                        Our clinic hours are:    Monday   7:30 am to 7:00 pm                  Tuesday through Friday 7:30 am to 5:00 pm                             Saturday   8:00 am to 12:00 pm      Sunday   Closed      Pharmacy Hours                        Our pharmacy hours are:    Monday   8:30 am to 7:00 pm       Tuesday to Friday  8:30 am to 6:00 pm                       Saturday    9:00 am to 1:00 pm              Sunday    Closed              There is also information available at our web site:  [email or web address]    If your provider ordered any lab tests and you do not receive the results within 10 business days, please call the clinic.    If you need a medication refill please contact your pharmacy.  Please allow 2-3 business days for your refill to be completed.    Our clinic offers telephone visits and e visits.  Please ask one of your team members to explain more.      Use [employer] (secure email communication and access to your chart) to send your primary care provider a message or make an appointment. Ask someone on your Team how to sign up for Within[employer].  Immunizations                      Immunization History    Administered Date(s) Administered     Influenza (H1N1) 01/12/2010     Influenza (IIV3) PF 11/05/2008, 11/03/2009, 12/10/2010     TD (ADULT, 7+) 01/01/2005     TDAP Vaccine (Boostrix) 07/29/2015        Health Maintenance                         Health Maintenance Due   Topic Date Due     Colon Cancer Screening - FIT Test - yearly  07/18/1968     HIV SCREEN (SYSTEM ASSIGNED)  07/18/1976     Hepatitis C Screening  07/18/1976     Cholesterol Lab - yearly  06/16/2010     Prostate Test (PSA) - yearly  04/01/2015     Depression Assessment 2 - yearly  03/30/2017     Microalbumin Lab - yearly  05/17/2018     Basic Metabolic Lab - yearly  08/16/2018     Flu Vaccine (1) 09/01/2018     Pneumonia Vaccine (1) 08/18/2018               Follow-ups after your visit        Additional Services     NEUROLOGY ADULT REFERRAL       Your provider has referred you for the following:   Consult at [facility]: [facility] [phone]  - Dr DOMINIQUE Back [email or web address]    Please be aware that coverage of these services is subject to the terms and limitations of your health insurance plan.  Call member services at your health plan with any benefit or coverage questions.      Please bring the following with you to your appointment:    (1) Any X-Rays, CTs or MRIs which have been performed.  Contact the facility where they were done to arrange for  prior to your scheduled appointment.    (2) List of current medications  (3) This referral request   (4) Any documents/labs given to you for this referral                  Follow-up notes from your care team     Return in about 1 month (around 10/17/2018), or if symptoms worsen or fail to improve, for Complete Physical.      Future tests that were ordered for you today     Open Future Orders        Priority Expected Expires Ordered    CBC with platelets Routine 10/17/2018 9/17/2019 9/17/2018    Hemoglobin A1c Routine 10/17/2018 9/17/2019  "2018    Albumin Random Urine Quantitative with Creat Ratio Routine  2019    *UA reflex to Microscopic and Culture ([place] [facility] (except [place] and [place]) Routine  2019    Fecal colorectal cancer screen (FIT) Routine 10/8/2018 2019 2018    Comprehensive metabolic panel Routine 10/17/2018 2019 2018    Lipid panel reflex to direct LDL Fasting Routine 10/17/2018 2019 2018    CK total Routine 10/17/2018 2019 2018            Who to contact     If you have questions or need follow up information about today's clinic visit or your schedule please contact [facility] directly at [phone].  Normal or non-critical lab and imaging results will be communicated to you by EasyPosthart, letter or phone within 4 business days after the clinic has received the results. If you do not hear from us within 7 days, please contact the clinic through EasyPost[employer] or phone. If you have a critical or abnormal lab result, we will notify you by phone as soon as possible.  Submit refill requests through [employer] or call your pharmacy and they will forward the refill request to us. Please allow 3 business days for your refill to be completed.          Additional Information About Your Visit        MyChart Information     [employer] lets you send messages to your doctor, view your test results, renew your prescriptions, schedule appointments and more. To sign up, go to [email or web address] . Click on \"Log in\" on the left side of the screen, which will take you to the Welcome page. Then click on \"Sign up Now\" on the right side of the page.     You will be asked to enter the access code listed below, as well as some personal information. Please follow the directions to create your username and password.     Your access code is: [email or web address]  Expires: 2018 12:01 PM     Your access code will  in 90 days. If you need help or a new code, " "please call your [facility] clinic or [phone].        Care EveryWhere ID     This is your Care EveryWhere ID. This could be used by other organizations to access your [facility] medical records  [id number]        Your Vitals Were     Pulse Temperature Height Pulse Oximetry BMI (Body Mass Index)       69 98.9  F (37.2  C) (Oral) 5' 8\" (1.727 m) 95% 35.12 kg/m2        Blood Pressure from Last 3 Encounters:   09/17/18 (!) 154/96   09/08/17 132/76   08/25/17 132/86    Weight from Last 3 Encounters:   09/17/18 231 lb (104.8 kg)   09/08/17 225 lb (102.1 kg)   08/25/17 223 lb (101.2 kg)              We Performed the Following     NEUROLOGY ADULT REFERRAL     PNEUMOCOCCAL VACCINE,ADULT,SQ OR IM          Today's Medication Changes          These changes are accurate as of 9/17/18 12:01 PM.  If you have any questions, ask your nurse or doctor.               These medicines have changed or have updated prescriptions.        Dose/Directions    losartan-hydrochlorothiazide 50-12.5 MG per tablet   Commonly known as:  HYZAAR   This may have changed:  See the new instructions.   Used for:  Hypertension goal BP (blood pressure) < 140/90   Changed by:  Maxwell Back MD        Dose:  1 tablet   Take 1 tablet by mouth daily   Quantity:  90 tablet   Refills:  3            Where to get your medicines      These medications were sent to [facility] [place] [id number]     Phone:  [phone]     losartan-hydrochlorothiazide 50-12.5 MG per tablet                Primary Care Provider Office Phone # Fax #    Maxwell Back [phone]       [address] [place] [address]        Equal Access to Services     KEVIN BOATENG : Eliezer ghosh Sojosafat, waaxda luqadaha, qaybta kaalmada adejasmineyada, anthony vieira. So [facility] [phone].    ATENCIÓN: Si habla español, tiene a keys disposición servicios " brit de asistencia lingüística. Kedar jackson [phone].    We comply with applicable federal civil rights laws and [place] laws. We do not discriminate on the basis of race, color, national origin, age, disability, sex, sexual orientation, or gender identity.            Thank you!     Thank you for choosing [facility]  for your care. Our goal is always to provide you with excellent care. Hearing back from our patients is one way we can continue to improve our services. Please take a few minutes to complete the written survey that you may receive in the mail after your visit with us. Thank you!             Your Updated Medication List - Protect others around you: Learn how to safely use, store and throw away your medicines at www.disposemymeds.org.          This list is accurate as of 9/17/18 12:01 PM.  Always use your most recent med list.                   Brand Name Dispense Instructions for use Diagnosis    aspirin 81 MG EC tablet     90 tablet    Take 1 tablet (81 mg) by mouth daily        losartan-hydrochlorothiazide 50-12.5 MG per tablet    HYZAAR    90 tablet    Take 1 tablet by mouth daily    Hypertension goal BP (blood pressure) < 140/90

## 2018-09-23 PROBLEM — E66.01 MORBID OBESITY (H): Status: ACTIVE | Noted: 2018-09-23

## 2018-10-01 ENCOUNTER — OFFICE VISIT (OUTPATIENT)
Dept: FAMILY MEDICINE | Facility: CLINIC | Age: 58
End: 2018-10-01
Payer: COMMERCIAL

## 2018-10-01 ENCOUNTER — RADIANT APPOINTMENT (OUTPATIENT)
Dept: GENERAL RADIOLOGY | Facility: CLINIC | Age: 58
End: 2018-10-01
Attending: PHYSICIAN ASSISTANT
Payer: COMMERCIAL

## 2018-10-01 VITALS
DIASTOLIC BLOOD PRESSURE: 98 MMHG | TEMPERATURE: 98.9 F | BODY MASS INDEX: 35.31 KG/M2 | OXYGEN SATURATION: 97 % | WEIGHT: 233 LBS | SYSTOLIC BLOOD PRESSURE: 158 MMHG | HEART RATE: 66 BPM | HEIGHT: 68 IN

## 2018-10-01 DIAGNOSIS — S99.921A INJURY OF RIGHT HEEL, INITIAL ENCOUNTER: Primary | ICD-10-CM

## 2018-10-01 DIAGNOSIS — S99.921A INJURY OF RIGHT HEEL, INITIAL ENCOUNTER: ICD-10-CM

## 2018-10-01 PROCEDURE — 99214 OFFICE O/P EST MOD 30 MIN: CPT | Performed by: PHYSICIAN ASSISTANT

## 2018-10-01 PROCEDURE — 73650 X-RAY EXAM OF HEEL: CPT | Mod: RT

## 2018-10-01 NOTE — PROGRESS NOTES
"  SUBJECTIVE:                                                    Yannick Jean is a 58 year old male who presents to clinic today for pain at back in right achilles tendon secondary to sustaining an acute injury 1 day ago. Patient was moving a boat and his right foot got stuck in a rock while the rest of his body moved forward. At the time of the injury, the patient characterized his pain as a sharp burning pain localized at the insertion of the achilles tendon. Walking or stepping on his toes makes it worse, 600 mg of NSAID and ice have provided moderate relief.     Joint Pain    Onset: Yesterday    Description:   Location: Right achilles area  Character: Sharp, Burning-when injury happened and tender to the touch    Intensity: moderate - sitting, severe - when moves wrong    Progression of Symptoms: worse    Accompanying Signs & Symptoms:  Other symptoms: weakness of foot and swelling    History:   Previous similar pain: no      Precipitating factors:   Trauma or overuse: YES- carrying a duck boat 100-120lb up a hill and foot jammed into rock, continued up hill felt like hot poker jammed into achilles area.     Alleviating factors:  Improved by: ibuprofen, ice - minor relief    Therapies Tried and outcome: heat - no relief    Problem list and histories reviewed & adjusted, as indicated.  Additional history: as documented      ROS:  Constitutional, HEENT, cardiovascular, pulmonary, GI, , musculoskeletal, neuro, skin, endocrine and psych systems are negative, except as otherwise noted.    OBJECTIVE:                                                    BP (!) 158/98 (BP Location: Left arm, Patient Position: Chair, Cuff Size: Adult Large)  Pulse 66  Temp 98.9  F (37.2  C) (Oral)  Ht 5' 8\" (1.727 m)  Wt 233 lb (105.7 kg)  SpO2 97%  BMI 35.43 kg/m2  Body mass index is 35.43 kg/(m^2).  GENERAL: healthy, alert and no distress  NECK: no adenopathy, no asymmetry, masses, or scars and thyroid normal to " palpation  RESP: lungs clear to auscultation - no rales, rhonchi or wheezes  CV: regular rate and rhythm, normal S1 S2, no S3 or S4, no murmur, click or rub, no peripheral edema and peripheral pulses strong  ABDOMEN: soft, nontender, no hepatosplenomegaly, no masses and bowel sounds normal  MS: decreased range of motion in right foot and tenderness to palpation at insertion of achilles on right foot. NORMAL Vergara test and no step offs appreciated in the posterior calf to heel bilaterally.    SKIN: Ecchymosis and erythema localized at insertion of achilles on   right foot    Diagnostic Test Results:  Xray - Unremarkable     ASSESSMENT/PLAN:                                                      Yannick was seen today for evaluation of right foot pain. After reviewing HPI and PE, patient was diagnosed with right achilles tendonitis secondary to acute injury. An Xray was ordered which showed bone spurs of the calcaneous, but no signs of fracture.     Patient was educated to Rest, Ice and elevate his foot. A boot was sized for him to use for the next week.     Diagnoses and all orders for this visit:    Injury of right heel, initial encounter  -     XR Calcaneus Right G/E 2 Views; Future    -- I have discussed the patient's diagnosis, and my plan of treatment with the patient and/or family. Patient is aware to followup if symptoms do not improve.  Patient has been advised to be seen sooner or seek more immediate care if symptoms change or worsen.  Patient agrees with and understands the plan today.     Followup: Return in about 1 week (around 10/8/2018) for Re-check of symptoms, please be seen sooner if needed.    See Patient Instructions      Rossy Vazquez PA-C    [facility] [employer] Student,  has participated in the care of this patient.     Provider Disclosure:  I agree with above History, Review of Systems, Physical exam and Plan.  I have reviewed the content of the documentation and  have edited it as needed. I have personally performed the services documented here and the documentation accurately represents those services and the decisions I have made.      Electronically signed by:      Rossy Vazquez PA-C

## 2018-10-01 NOTE — PATIENT INSTRUCTIONS
Understanding Achilles Tendonitis    Achilles tendonitis is an overuse injury. It results in inflammation of the Achilles tendon. This tendon is found on the back of the ankle. It links the calf muscle to the heel bone. It helps you do pushing-off movements like running or standing on your toes.     How to say it  uh-YARED-evelinaz ten-dun-I-tis   What causes Achilles tendonitis?  Achilles tendonitis can happen if you do an activity like running, walking, or jumping too much. This overuse can strain, or pull, the tendon. It may lead to minor tearing of the tendon. An injury to the lower leg or foot can also cause it.  If you don t warm up before taking part in sports such as basketball, you are more likely to suffer from this condition. You are also more prone to it if you do too much of such an activity too quickly. Proper training and rest can help prevent it.  Symptoms of Achilles tendonitis  The main symptom of Achilles tendonitis is pain. This pain mostly happens when you move the ankle. The tendon may also feel stiff after a period of no activity, such as sleeping. It may also become swollen. You may hear a crackling sound when you move your ankle.  Treatment for Achilles tendonitis  Symptoms often get better after starting treatment. A full recovery may take several months. Treatments include:    Rest. You should stop or change the activity that caused the injury. The tendon will then have time to heal.    Cold or heat pack. These help reduce pain and swelling.    Prescription or over-the-counter pain medicines. These help reduce pain and swelling.    Shoe inserts. These devices can reduce strain on the Achilles tendon when you move. You may then feel less pain.    Stretching and strengthening exercises. Certain exercises can help you regain flexibility and strength in your Achilles tendon.    Surgery. This option can fix the injured tendon. But you don t often need it unless other treatments don t work.     When  to call your healthcare provider   Call your healthcare provider right away if you have any of these:    Fever of 100.4 F (38 C) or higher, or as directed    Pain that gets worse    Symptoms that don t get better, or get worse    New symptoms    Date Last Reviewed: 3/10/2016    9459-4742 [employer]. [address] [place] [address], [place] [address]. All rights reserved. This information is not intended as a substitute for professional medical care. Always follow your healthcare professional's instructions.

## 2018-10-01 NOTE — MR AVS SNAPSHOT
After Visit Summary   10/1/2018    Yannick Jean    MRN: [id number]           Patient Information     Date Of Birth          1960        Visit Information        Provider Department      10/1/2018 3:20 PM Rossy Vazquez PA-C [facility] [place]        Today's Diagnoses     Injury of right heel, initial encounter    -  1      Care Instructions      Understanding Achilles Tendonitis    Achilles tendonitis is an overuse injury. It results in inflammation of the Achilles tendon. This tendon is found on the back of the ankle. It links the calf muscle to the heel bone. It helps you do pushing-off movements like running or standing on your toes.     How to say it  uh-KILL-eez ten-dun-I-tis   What causes Achilles tendonitis?  Achilles tendonitis can happen if you do an activity like running, walking, or jumping too much. This overuse can strain, or pull, the tendon. It may lead to minor tearing of the tendon. An injury to the lower leg or foot can also cause it.  If you don t warm up before taking part in sports such as basketball, you are more likely to suffer from this condition. You are also more prone to it if you do too much of such an activity too quickly. Proper training and rest can help prevent it.  Symptoms of Achilles tendonitis  The main symptom of Achilles tendonitis is pain. This pain mostly happens when you move the ankle. The tendon may also feel stiff after a period of no activity, such as sleeping. It may also become swollen. You may hear a crackling sound when you move your ankle.  Treatment for Achilles tendonitis  Symptoms often get better after starting treatment. A full recovery may take several months. Treatments include:    Rest. You should stop or change the activity that caused the injury. The tendon will then have time to heal.    Cold or heat pack. These help reduce pain and swelling.    Prescription or over-the-counter pain medicines. These help reduce pain  and swelling.    Shoe inserts. These devices can reduce strain on the Achilles tendon when you move. You may then feel less pain.    Stretching and strengthening exercises. Certain exercises can help you regain flexibility and strength in your Achilles tendon.    Surgery. This option can fix the injured tendon. But you don t often need it unless other treatments don t work.     When to call your healthcare provider   Call your healthcare provider right away if you have any of these:    Fever of 100.4 F (38 C) or higher, or as directed    Pain that gets worse    Symptoms that don t get better, or get worse    New symptoms    Date Last Reviewed: 3/10/2016    1799-7272 [employer]. [address] [place] [address]. All rights reserved. This information is not intended as a substitute for professional medical care. Always follow your healthcare professional's instructions.                Follow-ups after your visit        Follow-up notes from your care team     Return in about 1 week (around 10/8/2018) for Re-check of symptoms, please be seen sooner if needed.      Your next 10 appointments already scheduled     Jan 16, 2019  7:40 AM CST   [facility]    [address] [place] [address] [phone]              Who to contact     If you have questions or need follow up information about today's clinic visit or your schedule please contact [facility] directly at [phone].  Normal or non-critical lab and imaging results will be communicated to you by MyChart, letter or phone within 4 business days after the clinic has received the results. If you do not hear from us within 7 days, please contact the clinic through MyChart or phone. If you have a critical or abnormal lab result, we will notify you by phone as soon as possible.  Submit refill requests through [employer] or call your  "pharmacy and they will forward the refill request to us. Please allow 3 business days for your refill to be completed.          Additional Information About Your Visit        MyChart Information     [email or web address] lets you send messages to your doctor, view your test results, renew your prescriptions, schedule appointments and more. To sign up, go to [email or web address] . Click on \"Log in\" on the left side of the screen, which will take you to the Welcome page. Then click on \"Sign up Now\" on the right side of the page.     You will be asked to enter the access code listed below, as well as some personal information. Please follow the directions to create your username and password.     Your access code is: [email or web address]  Expires: 2018 12:01 PM     Your access code will  in 90 days. If you need help or a new code, please call your [facility] clinic or [phone].        Care EveryWhere ID     This is your Care EveryWhere ID. This could be used by other organizations to access your [facility] medical records  [id number]        Your Vitals Were     Pulse Temperature Height Pulse Oximetry BMI (Body Mass Index)       66 98.9  F (37.2  C) (Oral) 5' 8\" (1.727 m) 97% 35.43 kg/m2        Blood Pressure from Last 3 Encounters:   10/01/18 (!) 158/98   18 (!) 154/96   17 132/76    Weight from Last 3 Encounters:   10/01/18 233 lb (105.7 kg)   18 231 lb (104.8 kg)   17 225 lb (102.1 kg)               Primary Care Provider Office Phone # Fax #    Maxwell Back [phone] [address] [facility] [address]        Equal Access to Services     [facility] KILO : Eliezer Mark, wainessa mccord, qaybta kaalfavian bowie, anthony vieira. So [facility] [phone].    ATENCIÓN: Si habla español, tiene a keys disposición servicios gratuitos de asistencia lingüística. Llame al [phone].    We comply with applicable federal civil rights laws " and [place] laws. We do not discriminate on the basis of race, color, national origin, age, disability, sex, sexual orientation, or gender identity.            Thank you!     Thank you for choosing [facility]  for your care. Our goal is always to provide you with excellent care. Hearing back from our patients is one way we can continue to improve our services. Please take a few minutes to complete the written survey that you may receive in the mail after your visit with us. Thank you!             Your Updated Medication List - Protect others around you: Learn how to safely use, store and throw away your medicines at www.disposemymeds.org.          This list is accurate as of 10/1/18  4:52 PM.  Always use your most recent med list.                   Brand Name Dispense Instructions for use Diagnosis    aspirin 81 MG EC tablet     90 tablet    Take 1 tablet (81 mg) by mouth daily        losartan-hydrochlorothiazide 50-12.5 MG per tablet    HYZAAR    90 tablet    Take 1 tablet by mouth daily    Hypertension goal BP (blood pressure) < 140/90

## 2019-01-18 ENCOUNTER — OFFICE VISIT (OUTPATIENT)
Dept: FAMILY MEDICINE | Facility: CLINIC | Age: 59
End: 2019-01-18
Payer: COMMERCIAL

## 2019-01-18 VITALS
SYSTOLIC BLOOD PRESSURE: 158 MMHG | TEMPERATURE: 98.3 F | HEIGHT: 68 IN | OXYGEN SATURATION: 93 % | DIASTOLIC BLOOD PRESSURE: 100 MMHG | HEART RATE: 79 BPM | BODY MASS INDEX: 35.31 KG/M2 | WEIGHT: 233 LBS

## 2019-01-18 DIAGNOSIS — Z12.11 SCREEN FOR COLON CANCER: ICD-10-CM

## 2019-01-18 DIAGNOSIS — Z51.81 MEDICATION MONITORING ENCOUNTER: ICD-10-CM

## 2019-01-18 DIAGNOSIS — Z87.891 HISTORY OF TOBACCO USE: ICD-10-CM

## 2019-01-18 DIAGNOSIS — Z00.00 ENCOUNTER FOR ROUTINE ADULT HEALTH EXAMINATION WITHOUT ABNORMAL FINDINGS: Primary | ICD-10-CM

## 2019-01-18 DIAGNOSIS — R80.9 PROTEINURIA, UNSPECIFIED TYPE: ICD-10-CM

## 2019-01-18 DIAGNOSIS — N18.2 CKD (CHRONIC KIDNEY DISEASE) STAGE 2, GFR 60-89 ML/MIN: ICD-10-CM

## 2019-01-18 DIAGNOSIS — E78.5 HYPERLIPIDEMIA LDL GOAL <100: ICD-10-CM

## 2019-01-18 DIAGNOSIS — R73.03 PREDIABETES: ICD-10-CM

## 2019-01-18 DIAGNOSIS — Z12.5 SCREENING FOR PROSTATE CANCER: ICD-10-CM

## 2019-01-18 DIAGNOSIS — I10 HYPERTENSION GOAL BP (BLOOD PRESSURE) < 140/90: ICD-10-CM

## 2019-01-18 DIAGNOSIS — E66.01 MORBID OBESITY (H): ICD-10-CM

## 2019-01-18 LAB
ALBUMIN UR-MCNC: NEGATIVE MG/DL
APPEARANCE UR: CLEAR
BILIRUB UR QL STRIP: NEGATIVE
COLOR UR AUTO: YELLOW
ERYTHROCYTE [DISTWIDTH] IN BLOOD BY AUTOMATED COUNT: 12.8 % (ref 10–15)
GLUCOSE UR STRIP-MCNC: NEGATIVE MG/DL
HBA1C MFR BLD: 6.5 % (ref 0–5.6)
HCT VFR BLD AUTO: 52.9 % (ref 40–53)
HGB BLD-MCNC: 17.9 G/DL (ref 13.3–17.7)
HGB UR QL STRIP: NEGATIVE
KETONES UR STRIP-MCNC: NEGATIVE MG/DL
LEUKOCYTE ESTERASE UR QL STRIP: NEGATIVE
MCH RBC QN AUTO: 29.3 PG (ref 26.5–33)
MCHC RBC AUTO-ENTMCNC: 33.8 G/DL (ref 31.5–36.5)
MCV RBC AUTO: 87 FL (ref 78–100)
NITRATE UR QL: NEGATIVE
PH UR STRIP: 5.5 PH (ref 5–7)
PLATELET # BLD AUTO: 259 10E9/L (ref 150–450)
RBC # BLD AUTO: 6.1 10E12/L (ref 4.4–5.9)
SOURCE: NORMAL
SP GR UR STRIP: 1.02 (ref 1–1.03)
UROBILINOGEN UR STRIP-ACNC: 0.2 EU/DL (ref 0.2–1)
WBC # BLD AUTO: 8.7 10E9/L (ref 4–11)

## 2019-01-18 PROCEDURE — 80061 LIPID PANEL: CPT | Performed by: FAMILY MEDICINE

## 2019-01-18 PROCEDURE — 36415 COLL VENOUS BLD VENIPUNCTURE: CPT | Performed by: FAMILY MEDICINE

## 2019-01-18 PROCEDURE — 84443 ASSAY THYROID STIM HORMONE: CPT | Performed by: FAMILY MEDICINE

## 2019-01-18 PROCEDURE — 99396 PREV VISIT EST AGE 40-64: CPT | Performed by: FAMILY MEDICINE

## 2019-01-18 PROCEDURE — 80053 COMPREHEN METABOLIC PANEL: CPT | Performed by: FAMILY MEDICINE

## 2019-01-18 PROCEDURE — G0103 PSA SCREENING: HCPCS | Performed by: FAMILY MEDICINE

## 2019-01-18 PROCEDURE — 82043 UR ALBUMIN QUANTITATIVE: CPT | Performed by: FAMILY MEDICINE

## 2019-01-18 PROCEDURE — 82550 ASSAY OF CK (CPK): CPT | Performed by: FAMILY MEDICINE

## 2019-01-18 PROCEDURE — 81003 URINALYSIS AUTO W/O SCOPE: CPT | Performed by: FAMILY MEDICINE

## 2019-01-18 PROCEDURE — 85027 COMPLETE CBC AUTOMATED: CPT | Performed by: FAMILY MEDICINE

## 2019-01-18 PROCEDURE — 83036 HEMOGLOBIN GLYCOSYLATED A1C: CPT | Performed by: FAMILY MEDICINE

## 2019-01-18 RX ORDER — LOSARTAN POTASSIUM AND HYDROCHLOROTHIAZIDE 12.5; 1 MG/1; MG/1
1 TABLET ORAL DAILY
Qty: 90 TABLET | Refills: 3 | Status: SHIPPED | OUTPATIENT
Start: 2019-01-18 | End: 2019-03-27

## 2019-01-18 ASSESSMENT — MIFFLIN-ST. JEOR: SCORE: 1841.38

## 2019-01-18 NOTE — PROGRESS NOTES
SUBJECTIVE:   CC: Yannick Jean is an 58 year old male who presents for preventive health visit.     Healthy Habits:    Do you get at least three servings of calcium containing foods daily (dairy, green leafy vegetables, etc.)? no, taking calcium and/or vitamin D supplement: no    Amount of exercise or daily activities, outside of work: none    Problems taking medications regularly No    Medication side effects: No    Have you had an eye exam in the past two years? yes    Do you see a dentist twice per year? no    Do you have sleep apnea, excessive snoring or daytime drowsiness?no    Hypertension/CKD: Patient presents today as hypertensive(158/100). Patient is currently prescribed 50-12.5 mg Losartan-hydrochlorothiazide daily for hypertension management. Patient reports his BP when he checks at work is around 145/90-95.    BP Readings from Last 5 Encounters:   01/18/19 (!) 158/100   10/01/18 (!) 158/98   09/17/18 (!) 154/96   09/08/17 132/76   08/25/17 132/86     Creatinine   Date Value Ref Range Status   01/18/2019 0.99 0.66 - 1.25 mg/dL Final     Bell's Palsy: No acute issues.    GERD: Patient reports some recent GERD issues. Patient reports his GERD is well controlled by 2 Tums BID.    Prediabetes:     Glucose   Date Value Ref Range Status   01/18/2019 98 70 - 99 mg/dL Final     Comment:     Fasting specimen   08/16/2017 179 (H) 70 - 99 mg/dL Final     Comment:     Results confirmed by repeat test   05/24/2017 85 70 - 99 mg/dL Final   05/17/2017 144 (H) 70 - 99 mg/dL Final   09/23/2015 115 (H) 70 - 99 mg/dL Final     Lab Results   Component Value Date    A1C 6.1 08/17/2017    A1C 6.1 05/24/2017       Today's PHQ-2 Score:   PHQ-2 ( 1999 Pf[employer]) 1/18/2019 3/30/2016   Q1: Little interest or pleasure in doing things 0 0   Q2: Feeling down, depressed or hopeless 0 0   PHQ-2 Score 0 0       Abuse: Current or Past(Physical, Sexual or Emotional)- No  Do you feel safe in your environment? Yes    Social History      Tobacco Use     Smoking status: Former Smoker     Packs/day: 2.00     Years: 32.00     Pack years: 64.00     Types: Cigarettes     Last attempt to quit: 1/1/2009     Years since quitting: 10.0     Smokeless tobacco: Never Used   Substance Use Topics     Alcohol use: No     If you drink alcohol do you typically have >3 drinks per day or >7 drinks per week? No                      Last PSA:   PSA   Date Value Ref Range Status   01/18/2019 1.67 0 - 4 ug/L Final     Comment:     Assay Method:  Chemiluminescence using Siemens Vista analyzer       Reviewed orders with patient. Reviewed health maintenance and updated orders accordingly - Yes  Labs reviewed in EPIC    Reviewed and updated as needed this visit by clinical staff  Tobacco  Allergies  Meds  Med Hx  Surg Hx  Fam Hx  Soc Hx      Reviewed and updated as needed this visit by Provider  Allergies        Health Maintenance     Colonoscopy: Last 6/23/2009, Due 6/2019   FIT:  Not on file              PSA:  Last 4/1/2014, Due   DEXA:  N/A    Health Maintenance Due   Topic Date Due     FIT Q1 YR  07/18/1968     HEPATITIS C SCREENING  07/18/1976       Current Problem List    Patient Active Problem List   Diagnosis     ED (erectile dysfunction)     Hyperplastic colonic polyp     Hypertension goal BP (blood pressure) < 140/90     Cervical pain     S/P cervical spinal fusion     Advanced directives, counseling/discussion     CKD (chronic kidney disease) stage 2, GFR 60-89 ml/min     Bell's palsy     Prediabetes     Hyperlipidemia LDL goal <100     Morbid obesity (H)       Past Medical History    Past Medical History:   Diagnosis Date     Bell's palsy 08/2017     Cervical stenosis of spine 5/15    central and foraminal - moderate to severe     CKD (chronic kidney disease) stage 2, GFR 60-89 ml/min 05/2017     Colon polyps, hyperplastic 6/09    x 4 - due 2019     ED (erectile dysfunction)     dr atkinson     Hyperlipidemia LDL goal <100 9/17/2018     Hypertension goal  BP (blood pressure) < 140/90      Mixed hearing loss     Dr Pretty - [facility]     Prediabetes 2017     Tobacco use disorder     quit        Past Surgical History    Past Surgical History:   Procedure Laterality Date     COLONOSCOPY      hyperplastic - due 10 yrs     FUSION CERVICAL ANTERIOR THREE+ LEVELS N/A 2015    C3-6 FUSION CERVICAL ANTERIOR - Dr Morris     PE TUBES      PE tubes x 5     STRESS ECHO (METRO)      normal     SURGICAL HISTORY OF -       Rt Ear cholesteoma/tympanoplasty dr pretty     SURGICAL HISTORY OF -       dr patton - large lt foot ganglion cyst     TONSILLECTOMY & ADENOIDECTOMY  ,       Current Medications    Current Outpatient Medications   Medication Sig Dispense Refill     aspirin 81 MG EC tablet Take 1 tablet (81 mg) by mouth daily 90 tablet 3     losartan-hydrochlorothiazide (HYZAAR) 100-12.5 MG tablet Take 1 tablet by mouth daily 90 tablet 3       Allergies    Allergies   Allergen Reactions     Penicillins Unknown     Severe - ICU at age 1       Immunizations    Immunization History   Administered Date(s) Administered     Influenza (H1N1) 2010     Influenza (IIV3) PF 2008, 2009, 12/10/2010     Influenza Vaccine IM 3yrs+ 4 Valent IIV4 2018     Pneumococcal 23 valent 2018     TD (ADULT, 7+) 2005     TDAP Vaccine (Boostrix) 2015     Zoster vaccine recombinant adjuvanted (SHINGRIX) 2018, 2018       Family History    Family History   Problem Relation Age of Onset     Cardiovascular Father         First MI at 61, dies of MI at age 65     Chronic Obstructive Pulmonary Disease Mother      Macular Degeneration Mother      Breast Cancer Maternal Grandmother      Coronary Artery Disease Paternal Grandmother          at 50     Cerebrovascular Disease Paternal Grandfather        Social History    Social History     Socioeconomic History     Marital status:      Spouse name: aguila Son of  "children: 2     Years of education: 12     Highest education level: Not on file   Social Needs     Financial resource strain: Not on file     Food insecurity - worry: Not on file     Food insecurity - inability: Not on file     Transportation needs - medical: Not on file     Transportation needs - non-medical: Not on file   Occupational History     Employer: NONE    Tobacco Use     Smoking status: Former Smoker     Packs/day: 2.00     Years: 32.00     Pack years: 64.00     Types: Cigarettes     Last attempt to quit: 1/1/2009     Years since quitting: 10.0     Smokeless tobacco: Never Used   Substance and Sexual Activity     Alcohol use: No     Drug use: No     Sexual activity: Yes     Partners: Female   Other Topics Concern      Service Not Asked     Blood Transfusions Not Asked     Caffeine Concern Yes     Comment: 1-2 cups qd, 5 cans daily     Occupational Exposure Not Asked     Hobby Hazards Not Asked     Sleep Concern Not Asked     Stress Concern Not Asked     Weight Concern Not Asked     Special Diet Not Asked     Back Care Not Asked     Exercise No     Bike Helmet Not Asked     Seat Belt No     Self-Exams Not Asked     Parent/sibling w/ CABG, MI or angioplasty before 65F 55M? Not Asked   Social History Narrative     Not on file       ROS:  Constitutional, HEENT, cardiovascular, pulmonary, GI, , musculoskeletal, neuro, skin, endocrine and psych systems are negative, except as otherwise noted.    OBJECTIVE:   BP (!) 158/100   Pulse 79   Temp 98.3  F (36.8  C) (Oral)   Ht 1.727 m (5' 8\")   Wt 105.7 kg (233 lb)   SpO2 93%   BMI 35.43 kg/m    EXAM:  GENERAL: healthy, alert and no distress  EYES: Eyes grossly normal to inspection  HENT:ear canals and TM's normal upon viewing with otoscope, nose and mouth without ulcers or lesions upon viewing with otoscope  NECK: no adenopathy, no asymmetry, masses, or scars and thyroid normal to palpation  RESP: lungs clear to auscultation - no rales, rhonchi or " wheezes  CV: regular rate and rhythm, normal S1 S2, no S3 or S4, no murmur, click or rub, no peripheral edema and peripheral pulses strong  ABDOMEN: soft, nontender, no hepatosplenomegaly, no masses and bowel sounds normal   (male): normal male genitalia without lesions or urethral discharge, no hernia  RECTAL: normal sphincter tone, no rectal masses, prostate normal size, smooth, nontender without nodules or masses  MS: no gross musculoskeletal defects noted, no edema  SKIN: no suspicious lesions or rashes  NEURO: Normal strength and tone, mentation intact and speech normal  PSYCH: mentation appears normal, affect normal/bright  BACK: no CVA tenderness, no paralumbar tenderness    Diagnostic Test Results:  No results found for this or any previous visit (from the past 24 hour(s)).     Labs Pending    ASSESSMENT/PLAN:       ICD-10-CM    1. Encounter for routine adult health examination without abnormal findings Z00.00 Comprehensive metabolic panel     Lipid panel reflex to direct LDL Fasting     CK total     CBC with platelets     TSH with free T4 reflex     UA reflex to Microscopic and Culture     Albumin Random Urine Quantitative with Creat Ratio     Prostate spec antigen screen     Fecal colorectal cancer screen FIT     Hemoglobin A1c     GASTROENTEROLOGY ADULT REF PROCEDURE ONLY   2. Prediabetes R73.03 Comprehensive metabolic panel     Lipid panel reflex to direct LDL Fasting     UA reflex to Microscopic and Culture     Albumin Random Urine Quantitative with Creat Ratio     Hemoglobin A1c     losartan-hydrochlorothiazide (HYZAAR) 100-12.5 MG tablet   3. CKD (chronic kidney disease) stage 2, GFR 60-89 ml/min N18.2 Comprehensive metabolic panel     UA reflex to Microscopic and Culture     Albumin Random Urine Quantitative with Creat Ratio     losartan-hydrochlorothiazide (HYZAAR) 100-12.5 MG tablet   4. Proteinuria, unspecified type R80.9 Comprehensive metabolic panel     UA reflex to Microscopic and Culture      Albumin Random Urine Quantitative with Creat Ratio     losartan-hydrochlorothiazide (HYZAAR) 100-12.5 MG tablet   5. Hypertension goal BP (blood pressure) < 140/90 I10 Comprehensive metabolic panel     UA reflex to Microscopic and Culture     Albumin Random Urine Quantitative with Creat Ratio     losartan-hydrochlorothiazide (HYZAAR) 100-12.5 MG tablet   6. Hyperlipidemia LDL goal <100 E78.5 Comprehensive metabolic panel     Lipid panel reflex to direct LDL Fasting     CK total   7. History of tobacco use Z87.891 CT Chest Low Dose Non Contrast   8. Morbid obesity (H) E66.01 TSH with free T4 reflex   9. Screen for colon cancer Z12.11 Fecal colorectal cancer screen FIT     GASTROENTEROLOGY ADULT REF PROCEDURE ONLY   10. Screening for prostate cancer Z12.5 Prostate spec antigen screen   11. Medication monitoring encounter Z51.81 Comprehensive metabolic panel     Lipid panel reflex to direct LDL Fasting     CK total     CBC with platelets     TSH with free T4 reflex     UA reflex to Microscopic and Culture     Albumin Random Urine Quantitative with Creat Ratio     Hemoglobin A1c     GASTROENTEROLOGY ADULT REF PROCEDURE ONLY     Discussed treatment/modality options, including risk and benefits, he desires advised aspirin 81 mg po daily, advised 1 multivitamin per day, advised dentist every 6 months, advised diet and exercise and advised opthalmologist every 1-2 years. All diagnosis above reviewed and noted above, otherwise stable.  See [facility] orders for further details.      1) Patient presents today as hypertensive(158/100). Patient is currently prescribed 50-12.5 mg Losartan-hydrochlorothiazide daily for hypertension management. Patient reports his BP when he checks at work is around 145/90-95. Patient's Losartan-hydrochlorothiazide dosage increased to 100-12.5 mg Losartan-hydrochlorothiazide daily for further hypertension management. Follow up in 1-2 weeks for blood pressure recheck visit with nurse/pharmacy.    2)  "Colonoscopy ordered today. Chest CT ordered today due to Hx of tobacco abuse.    3) Recommend 1 pound of weight loss per week.    4) Patient reports his GERD is well controlled by 2 Tums BID. Advised continued use. Advised patient to consider Zantac or Omeprazole.    5) Follow up in 3 months for medication recheck visit.    Health Maintenance Due   Topic Date Due     FIT Q1 YR  07/18/1968     HEPATITIS C SCREENING  07/18/1976       COUNSELING:  Reviewed preventive health counseling, as reflected in patient instructions    BP Readings from Last 1 Encounters:   01/18/19 (!) 158/100     Estimated body mass index is 35.43 kg/m  as calculated from the following:    Height as of this encounter: 1.727 m (5' 8\").    Weight as of this encounter: 105.7 kg (233 lb).    Weight management plan: Discussed healthy diet and exercise guidelines     reports that he quit smoking about 10 years ago. His smoking use included cigarettes. He has a 64.00 pack-year smoking history. he has never used smokeless tobacco.    Counseling Resources:  ATP IV Guidelines  Pooled Cohorts Equation Calculator  FRAX Risk Assessment  ICSI Preventive Guidelines  Dietary Guidelines for Americans, 2010  USDA's MyPlate  ASA Prophylaxis  Lung CA Screening    This document serves as a record of the services and decisions personally performed and made by Maxwell Back MD. It was created on his behalf by Christopher Felix, a trained medical scribe. The creation of this document is based on the provider's statements to the medical scribe.  Christopher Felix January 18, 2019 11:22 AM     The information in this document, created by the medical scribe for me, accurately reflects the services I personally performed and the decisions made by me. I have reviewed and approved this document for accuracy prior to leaving the patient care area.  January 18, 2019          Maxwell Back MD, [facility]    [address]  [address]    [phone] " [phone] Fax

## 2019-01-18 NOTE — PATIENT INSTRUCTIONS
Colonoscopy ordered today. Chest CT ordered today due to history of tobacco abuse. Losartan-hydrochlorothiazide dosage increased to 100-12.5 mg Losartan-hydrochlorothiazide daily for further hypertension management. Recommend 1 pound of weight loss per week. Follow up in 1-2 weeks for blood pressure recheck visit with nurse/pharmacy.    [facility]                        To reach your care team during and after hours:   [phone]  To reach our pharmacy:        [phone]    Clinic Hours                        Our clinic hours are:    Monday   7:30 am to 7:00 pm                  Tuesday through Friday 7:30 am to 5:00 pm                             Saturday   8:00 am to 12:00 pm      Sunday   Closed      Pharmacy Hours                        Our pharmacy hours are:    Monday   8:30 am to 7:00 pm       Tuesday to Friday  8:30 am to 6:00 pm                       Saturday    9:00 am to 1:00 pm              Sunday    Closed              There is also information available at our web site:  [email or web address]    If your provider ordered any lab tests and you do not receive the results within 10 business days, please call the clinic.    If you need a medication refill please contact your pharmacy.  Please allow 2-3 business days for your refill to be completed.    Our clinic offers telephone visits and e visits.  Please ask one of your team members to explain more.      Use [employer] (secure email communication and access to your chart) to send your primary care provider a message or make an appointment. Ask someone on your Team how to sign up for [employer].  Immunizations                      Immunization History   Administered Date(s) Administered     Influenza (H1N1) 01/12/2010     Influenza (IIV3) PF 11/05/2008, 11/03/2009, 12/10/2010     Influenza Vaccine IM 3yrs+ 4 Valent IIV4 11/01/2018     Pneumococcal 23 valent 09/17/2018     TD (ADULT, 7+) 01/01/2005     TDAP Vaccine (Boostrix) 07/29/2015      Zoster vaccine recombinant adjuvanted (SHINGRIX) 09/17/2018, 11/21/2018        Health Maintenance                         Health Maintenance Due   Topic Date Due     Colon Cancer Screening - FIT Test - yearly  07/18/1968     Cholesterol Lab - yearly  06/16/2010     Prostate Test (PSA) - yearly  04/01/2015     Depression Assessment 2 - yearly  03/30/2017     Microalbumin Lab - yearly  05/17/2018     Basic Metabolic Lab - yearly  08/16/2018     Flu Vaccine (1) 09/01/2018       Preventive Health Recommendations  Male Ages 50 - 64    Yearly exam:             See your health care provider every year in order to  o   Review health changes.   o   Discuss preventive care.    o   Review your medicines if your doctor has prescribed any.     Have a cholesterol test every 5 years, or more frequently if you are at risk for high cholesterol/heart disease.     Have a diabetes test (fasting glucose) every three years. If you are at risk for diabetes, you should have this test more often.     Have a colonoscopy at age 50, or have a yearly FIT test (stool test). These exams will check for colon cancer.      Talk with your health care provider about whether or not a prostate cancer screening test (PSA) is right for you.    You should be tested each year for STDs (sexually transmitted diseases), if you re at risk.     Shots: Get a flu shot each year. Get a tetanus shot every 10 years.     Nutrition:    Eat at least 5 servings of fruits and vegetables daily.     Eat whole-grain bread, whole-wheat pasta and brown rice instead of white grains and rice.     Get adequate Calcium and Vitamin D.     Lifestyle    Exercise for at least 150 minutes a week (30 minutes a day, 5 days a week). This will help you control your weight and prevent disease.     Limit alcohol to one drink per day.     No smoking.     Wear sunscreen to prevent skin cancer.     See your dentist every six months for an exam and cleaning.     See your eye doctor every 1 to 2  years.

## 2019-01-19 LAB
ALBUMIN SERPL-MCNC: 4.2 G/DL (ref 3.4–5)
ALP SERPL-CCNC: 43 U/L (ref 40–150)
ALT SERPL W P-5'-P-CCNC: 40 U/L (ref 0–70)
ANION GAP SERPL CALCULATED.3IONS-SCNC: 10 MMOL/L (ref 3–14)
AST SERPL W P-5'-P-CCNC: 19 U/L (ref 0–45)
BILIRUB SERPL-MCNC: 0.5 MG/DL (ref 0.2–1.3)
BUN SERPL-MCNC: 19 MG/DL (ref 7–30)
CALCIUM SERPL-MCNC: 9.7 MG/DL (ref 8.5–10.1)
CHLORIDE SERPL-SCNC: 104 MMOL/L (ref 94–109)
CHOLEST SERPL-MCNC: 179 MG/DL
CK SERPL-CCNC: 110 U/L (ref 30–300)
CO2 SERPL-SCNC: 25 MMOL/L (ref 20–32)
CREAT SERPL-MCNC: 0.99 MG/DL (ref 0.66–1.25)
CREAT UR-MCNC: 139 MG/DL
GFR SERPL CREATININE-BSD FRML MDRD: 82 ML/MIN/{1.73_M2}
GLUCOSE SERPL-MCNC: 98 MG/DL (ref 70–99)
HDLC SERPL-MCNC: 40 MG/DL
LDLC SERPL CALC-MCNC: 105 MG/DL
MICROALBUMIN UR-MCNC: 50 MG/L
MICROALBUMIN/CREAT UR: 35.61 MG/G CR (ref 0–17)
NONHDLC SERPL-MCNC: 139 MG/DL
POTASSIUM SERPL-SCNC: 4.7 MMOL/L (ref 3.4–5.3)
PROT SERPL-MCNC: 7.7 G/DL (ref 6.8–8.8)
PSA SERPL-ACNC: 1.67 UG/L (ref 0–4)
SODIUM SERPL-SCNC: 139 MMOL/L (ref 133–144)
TRIGL SERPL-MCNC: 169 MG/DL
TSH SERPL DL<=0.005 MIU/L-ACNC: 1.3 MU/L (ref 0.4–4)

## 2019-01-30 ENCOUNTER — OFFICE VISIT (OUTPATIENT)
Dept: FAMILY MEDICINE | Facility: CLINIC | Age: 59
End: 2019-01-30
Payer: COMMERCIAL

## 2019-01-30 VITALS
OXYGEN SATURATION: 95 % | HEIGHT: 68 IN | HEART RATE: 76 BPM | DIASTOLIC BLOOD PRESSURE: 88 MMHG | TEMPERATURE: 97.4 F | BODY MASS INDEX: 35.77 KG/M2 | WEIGHT: 236 LBS | SYSTOLIC BLOOD PRESSURE: 144 MMHG

## 2019-01-30 DIAGNOSIS — N18.2 CKD (CHRONIC KIDNEY DISEASE) STAGE 2, GFR 60-89 ML/MIN: ICD-10-CM

## 2019-01-30 DIAGNOSIS — Z51.81 MEDICATION MONITORING ENCOUNTER: ICD-10-CM

## 2019-01-30 DIAGNOSIS — I10 HYPERTENSION GOAL BP (BLOOD PRESSURE) < 140/90: ICD-10-CM

## 2019-01-30 DIAGNOSIS — E66.01 MORBID OBESITY (H): ICD-10-CM

## 2019-01-30 DIAGNOSIS — R80.9 PROTEINURIA, UNSPECIFIED TYPE: ICD-10-CM

## 2019-01-30 DIAGNOSIS — E78.5 HYPERLIPIDEMIA LDL GOAL <100: ICD-10-CM

## 2019-01-30 DIAGNOSIS — R73.03 PREDIABETES: Primary | ICD-10-CM

## 2019-01-30 PROCEDURE — 99214 OFFICE O/P EST MOD 30 MIN: CPT | Performed by: FAMILY MEDICINE

## 2019-01-30 ASSESSMENT — MIFFLIN-ST. JEOR: SCORE: 1854.99

## 2019-01-30 NOTE — PATIENT INSTRUCTIONS
Diabetes Goals:   1) Blood glucose  fasting.  2) Blood glucose less than 180 2 hours after eating.    Recommend daily exercise, low carb low salt diet, and 1 pound of weight loss per week for prediabetes management.    [facility]                        To reach your care team during and after hours:   [phone]  To reach our pharmacy:        [phone]    Clinic Hours                        Our clinic hours are:    Monday   7:30 am to 7:00 pm                  Tuesday through Friday 7:30 am to 5:00 pm                             Saturday   8:00 am to 12:00 pm      Sunday   Closed      Pharmacy Hours                        Our pharmacy hours are:    Monday   8:30 am to 7:00 pm       Tuesday to Friday  8:30 am to 6:00 pm                       Saturday    9:00 am to 1:00 pm              Sunday    Closed              There is also information available at our web site:  [email or web address]    If your provider ordered any lab tests and you do not receive the results within 10 business days, please call the clinic.    If you need a medication refill please contact your pharmacy.  Please allow 2-3 business days for your refill to be completed.    Our clinic offers telephone visits and e visits.  Please ask one of your team members to explain more.      Use [employer] (secure email communication and access to your chart) to send your primary care provider a message or make an appointment. Ask someone on your Team how to sign up for [employer].  Immunizations                      Immunization History   Administered Date(s) Administered     Influenza (H1N1) 01/12/2010     Influenza (IIV3) PF 11/05/2008, 11/03/2009, 12/10/2010     Influenza Vaccine IM 3yrs+ 4 Valent IIV4 11/01/2018     Pneumococcal 23 valent 09/17/2018     TD (ADULT, 7+) 01/01/2005     TDAP Vaccine (Boostrix) 07/29/2015     Zoster vaccine recombinant adjuvanted (SHINGRIX) 09/17/2018, 11/21/2018        Health Maintenance                          Health Maintenance Due   Topic Date Due     Colon Cancer Screening - FIT Test - yearly  07/18/1968     Hepatitis C Screening  07/18/1976

## 2019-01-30 NOTE — PROGRESS NOTES
SUBJECTIVE:   Yannick Jean is a 58 year old male who presents to clinic today for the following health issues:    Patient is following up due to recent lab results. Previous lab results reviewed in detail with patient.    Prediabetes, possibly transitioning to Diabates: A1C elevated on previous labs.    Glucose   Date Value Ref Range Status   01/18/2019 98 70 - 99 mg/dL Final     Comment:     Fasting specimen   08/16/2017 179 (H) 70 - 99 mg/dL Final     Comment:     Results confirmed by repeat test   05/24/2017 85 70 - 99 mg/dL Final   05/17/2017 144 (H) 70 - 99 mg/dL Final   09/23/2015 115 (H) 70 - 99 mg/dL Final     Lab Results   Component Value Date    A1C 6.5 01/18/2019    A1C 6.1 08/17/2017    A1C 6.1 05/24/2017     Recent Labs   Lab Test 01/18/19  1227   CHOL 179   HDL 40   *   TRIG 169*     CKD/Hypertension/Proteinuria: Patient presents today as hypertensive(148/90). Patient's blood pressure upon physical exam was 144/88. Patient is currently prescribed 100-12.5 mg Losartan-Hydrochlorothiazide daily for hypertension management.    BP Readings from Last 5 Encounters:   01/30/19 144/88   01/18/19 (!) 158/100   10/01/18 (!) 158/98   09/17/18 (!) 154/96   09/08/17 132/76     Creatinine   Date Value Ref Range Status   01/18/2019 0.99 0.66 - 1.25 mg/dL Final     Problem list and histories reviewed & adjusted, as indicated.  Additional history: as documented    body mass index is 35.88 kg/m .    Wt Readings from Last 4 Encounters:   01/30/19 107 kg (236 lb)   01/18/19 105.7 kg (233 lb)   10/01/18 105.7 kg (233 lb)   09/17/18 104.8 kg (231 lb)       Health Maintenance    Health Maintenance Due   Topic Date Due     FOOT EXAM Q1 YEAR  07/18/1959     EYE EXAM Q1 YEAR  07/18/1959     FIT Q1 YR  07/18/1968     HEPATITIS C SCREENING  07/18/1976       Current Problem List    Patient Active Problem List   Diagnosis     ED (erectile dysfunction)     Hyperplastic colonic polyp     Hypertension goal BP (blood  pressure) < 140/90     Cervical pain     S/P cervical spinal fusion     Advanced directives, counseling/discussion     CKD (chronic kidney disease) stage 2, GFR 60-89 ml/min     Bell's palsy     Prediabetes     Hyperlipidemia LDL goal <100     Morbid obesity (H)     Diabetes mellitus, type 2 (H)       Past Medical History    Past Medical History:   Diagnosis Date     Bell's palsy 08/2017     Cervical stenosis of spine 5/15    central and foraminal - moderate to severe     CKD (chronic kidney disease) stage 2, GFR 60-89 ml/min 05/2017     Colon polyps, hyperplastic 6/09    x 4 - due 2019     Diabetes mellitus, type 2 (H) 1/31/2019     ED (erectile dysfunction)     dr atkinson     Hyperlipidemia LDL goal <100 9/17/2018     Hypertension goal BP (blood pressure) < 140/90      Mixed hearing loss     Dr Pretty - [facility]     Prediabetes 05/2017     Tobacco use disorder     quit 1/09       Past Surgical History    Past Surgical History:   Procedure Laterality Date     COLONOSCOPY  6/09    hyperplastic - due 10 yrs     FUSION CERVICAL ANTERIOR THREE+ LEVELS N/A 7/30/2015    C3-6 FUSION CERVICAL ANTERIOR - Dr Morris     PE TUBES      PE tubes x 5     STRESS ECHO (METRO)  1/08    normal     SURGICAL HISTORY OF -   12/08    Rt Ear cholesteoma/tympanoplasty dr pretty     SURGICAL HISTORY OF -   7/09    dr patton - large lt foot ganglion cyst     TONSILLECTOMY & ADENOIDECTOMY  1963,1989       Current Medications    Current Outpatient Medications   Medication Sig Dispense Refill     aspirin 81 MG EC tablet Take 1 tablet (81 mg) by mouth daily 90 tablet 3     losartan-hydrochlorothiazide (HYZAAR) 100-12.5 MG tablet Take 1 tablet by mouth daily 90 tablet 3       Allergies    Allergies   Allergen Reactions     Penicillins Unknown     Severe - ICU at age 1       Immunizations    Immunization History   Administered Date(s) Administered     Influenza (H1N1) 01/12/2010     Influenza (IIV3) PF 11/05/2008, 11/03/2009, 12/10/2010      Influenza Vaccine IM 3yrs+ 4 Valent IIV4 2018     Pneumococcal 23 valent 2018     TD (ADULT, 7+) 2005     TDAP Vaccine (Boostrix) 2015     Zoster vaccine recombinant adjuvanted (SHINGRIX) 2018, 2018       Family History    Family History   Problem Relation Age of Onset     Cardiovascular Father         First MI at 61, dies of MI at age 65     Chronic Obstructive Pulmonary Disease Mother      Macular Degeneration Mother      Breast Cancer Maternal Grandmother      Coronary Artery Disease Paternal Grandmother          at 50     Cerebrovascular Disease Paternal Grandfather        Social History    Social History     Socioeconomic History     Marital status:      Spouse name: aguila     Number of children: 2     Years of education: 12     Highest education level: Not on file   Social Needs     Financial resource strain: Not on file     Food insecurity - worry: Not on file     Food insecurity - inability: Not on file     Transportation needs - medical: Not on file     Transportation needs - non-medical: Not on file   Occupational History     Employer: NONE    Tobacco Use     Smoking status: Former Smoker     Packs/day: 2.00     Years: 32.00     Pack years: 64.00     Types: Cigarettes     Last attempt to quit: 2009     Years since quitting: 10.0     Smokeless tobacco: Never Used   Substance and Sexual Activity     Alcohol use: No     Drug use: No     Sexual activity: Yes     Partners: Female   Other Topics Concern      Service Not Asked     Blood Transfusions Not Asked     Caffeine Concern Yes     Comment: 1-2 cups qd, 5 cans daily     Occupational Exposure Not Asked     Hobby Hazards Not Asked     Sleep Concern Not Asked     Stress Concern Not Asked     Weight Concern Not Asked     Special Diet Not Asked     Back Care Not Asked     Exercise No     Bike Helmet Not Asked     Seat Belt No     Self-Exams Not Asked     Parent/sibling w/ CABG, MI or angioplasty before  "65F 55M? Not Asked   Social History Narrative     Not on file       All above reviewed and updated, all stable unless otherwise noted    Recent labs reviewed    ROS:  Constitutional, HEENT, cardiovascular, pulmonary, GI, , musculoskeletal, neuro, skin, endocrine and psych systems are negative, except as otherwise noted.    OBJECTIVE:                                                    /88   Pulse 76   Temp 97.4  F (36.3  C) (Oral)   Ht 1.727 m (5' 8\")   Wt 107 kg (236 lb)   SpO2 95%   BMI 35.88 kg/m    Body mass index is 35.88 kg/m .  GENERAL: healthy, alert and no distress  EYES: Eyes grossly normal to inspection  HENT:ear canals and TM's normal upon viewing with otoscope, nose and mouth without ulcers or lesions upon viewing with otoscope  NECK: no tenderness, no adenopathy, no asymmetry, no masses, no stiffness; thyroid- normal to palpation  RESP: lungs clear to auscultation - no rales, no rhonchi, no wheezes  CV: regular rates and rhythm, normal S1 S2, no S3 or S4 and no murmur, no click or rub -  ABDOMEN: soft, no tenderness, no  hepatosplenomegaly, no masses, normal bowel sounds  MS: extremities- no gross deformities noted, no edema  SKIN: no suspicious lesions, no rashes  NEURO: strength and tone- normal, sensory exam- grossly normal, mentation- intact, speech- normal  BACK: no CVA tenderness, no paralumbar tenderness  PSYCH: Alert and oriented times 3; speech- coherent , normal rate and volume; able to articulate logical thoughts, able to abstract reason, no tangential thoughts, no hallucinations or delusions, affect- normal    DIAGNOSTICS/PROCEDURES:                                                      No results found for this or any previous visit (from the past 24 hour(s)).     ASSESSMENT/PLAN:                                                        ICD-10-CM    1. Prediabetes R73.03    2. CKD (chronic kidney disease) stage 2, GFR 60-89 ml/min N18.2    3. Proteinuria, unspecified type R80.9  "   4. Hypertension goal BP (blood pressure) < 140/90 I10    5. Hyperlipidemia LDL goal <100 E78.5    6. Morbid obesity (H) E66.01    7. Medication monitoring encounter Z51.81        Discussed treatment/modality options, including risk and benefits, he desires advised aspirin 81 mg po daily, advised 1 multivitamin per day, advised dentist every 6 months, advised diet and exercise and advised opthalmologist every 1-2 years. All diagnosis above reviewed and noted above, otherwise stable.  See [facility] orders for further details.  Follow up as needed.    1) Recommend daily exercise, low carb low salt diet, and 1 pound of weight loss per week for prediabetes/diabetes management. Referred to Diabetic educator today.      2) Patient presents today as hypertensive(148/90). Patient's blood pressure upon physical exam was 144/88. Patient is currently prescribed 100-12.5 mg Losartan-Hydrochlorothiazide daily for hypertension management. Advised continued use. BP check soon.    3) Follow up in 1-3 months for fasting labs and medication recheck visit.    Health Maintenance Due   Topic Date Due     FOOT EXAM Q1 YEAR  07/18/1961     EYE EXAM Q1 YEAR  07/18/1959     FIT Q1 YR  07/18/1968     HEPATITIS C SCREENING  07/18/1976     This document serves as a record of the services and decisions personally performed and made by Maxwell Back MD. It was created on his behalf by Christopher Felix, a trained medical scribe. The creation of this document is based on the provider's statements to the medical scribe.  Christopher Felix January 30, 2019 3:59 PM     The information in this document, created by the medical scribe for me, accurately reflects the services I personally performed and the decisions made by me. I have reviewed and approved this document for accuracy prior to leaving the patient care area.  January 30, 2019            Maxwell Back MD [address]  [address] [phone]  [phone] Fax

## 2019-01-31 PROBLEM — E11.9 DIABETES MELLITUS, TYPE 2 (H): Status: ACTIVE | Noted: 2019-01-31

## 2019-02-19 ENCOUNTER — OFFICE VISIT (OUTPATIENT)
Dept: FAMILY MEDICINE | Facility: CLINIC | Age: 59
End: 2019-02-19

## 2019-02-19 ENCOUNTER — ANCILLARY PROCEDURE (OUTPATIENT)
Dept: GENERAL RADIOLOGY | Facility: CLINIC | Age: 59
End: 2019-02-19
Attending: PHYSICIAN ASSISTANT

## 2019-02-19 VITALS
BODY MASS INDEX: 35.28 KG/M2 | SYSTOLIC BLOOD PRESSURE: 132 MMHG | HEART RATE: 103 BPM | TEMPERATURE: 99.3 F | WEIGHT: 232 LBS | DIASTOLIC BLOOD PRESSURE: 72 MMHG | OXYGEN SATURATION: 93 %

## 2019-02-19 DIAGNOSIS — R06.02 SOB (SHORTNESS OF BREATH): ICD-10-CM

## 2019-02-19 DIAGNOSIS — J18.9 PNEUMONIA OF RIGHT LOWER LOBE DUE TO INFECTIOUS ORGANISM: ICD-10-CM

## 2019-02-19 DIAGNOSIS — M54.41 ACUTE BILATERAL LOW BACK PAIN WITH RIGHT-SIDED SCIATICA: ICD-10-CM

## 2019-02-19 DIAGNOSIS — R06.02 SOB (SHORTNESS OF BREATH): Primary | ICD-10-CM

## 2019-02-19 LAB
DEPRECATED S PYO AG THROAT QL EIA: NORMAL
SPECIMEN SOURCE: NORMAL

## 2019-02-19 PROCEDURE — 71046 X-RAY EXAM CHEST 2 VIEWS: CPT | Mod: FY

## 2019-02-19 PROCEDURE — 87880 STREP A ASSAY W/OPTIC: CPT | Performed by: PHYSICIAN ASSISTANT

## 2019-02-19 PROCEDURE — 99214 OFFICE O/P EST MOD 30 MIN: CPT | Performed by: PHYSICIAN ASSISTANT

## 2019-02-19 PROCEDURE — 87081 CULTURE SCREEN ONLY: CPT | Performed by: PHYSICIAN ASSISTANT

## 2019-02-19 RX ORDER — PREDNISONE 20 MG/1
TABLET ORAL
Qty: 20 TABLET | Refills: 0 | Status: SHIPPED | OUTPATIENT
Start: 2019-02-19 | End: 2019-03-22

## 2019-02-19 RX ORDER — CYCLOBENZAPRINE HCL 10 MG
10 TABLET ORAL 3 TIMES DAILY PRN
Qty: 30 TABLET | Refills: 0 | Status: SHIPPED | OUTPATIENT
Start: 2019-02-19 | End: 2019-03-22

## 2019-02-19 RX ORDER — AZITHROMYCIN 250 MG/1
TABLET, FILM COATED ORAL
Qty: 6 TABLET | Refills: 0 | Status: SHIPPED | OUTPATIENT
Start: 2019-02-19 | End: 2019-02-24

## 2019-02-19 NOTE — PROGRESS NOTES
SUBJECTIVE:   Yannick Jean is a 58 year old male who presents to clinic today for the following health issues:      RESPIRATORY SYMPTOMS      Duration: 6-10 days    Description  sore throat, cough, headache and trouble catching breath    Severity: severe    Accompanying signs and symptoms: None    History (predisposing factors):  tobacco abuse and exposure to smoke - has not smoked since 2009    Precipitating or alleviating factors: None    Therapies tried and outcome:  Cough syrup and drops - not helping    Musculoskeletal problem/pain      Duration: 1 month ago    Description  Location: Right glute/hip, radiate down to knee    Intensity:  severe    Accompanying signs and symptoms: numbness and burning in right thigh when he stands for an extended period of time    History  Previous similar problem: fell and hurt leg    Previous evaluation:  none    Precipitating or alleviating factors:  Trauma or overuse: no   Aggravating factors include: sitting, standing, walking, climbing stairs, lifting, exercise and overuse    Therapies tried and outcome: rest/inactivity          Problem list and histories reviewed & adjusted, as indicated.  Additional history: as documented    Patient Active Problem List   Diagnosis     ED (erectile dysfunction)     Hyperplastic colonic polyp     Hypertension goal BP (blood pressure) < 140/90     Cervical pain     S/P cervical spinal fusion     Advanced directives, counseling/discussion     CKD (chronic kidney disease) stage 2, GFR 60-89 ml/min     Bell's palsy     Prediabetes     Hyperlipidemia LDL goal <100     Morbid obesity (H)     Diabetes mellitus, type 2 (H)     Past Surgical History:   Procedure Laterality Date     COLONOSCOPY  6/09    hyperplastic - due 10 yrs     FUSION CERVICAL ANTERIOR THREE+ LEVELS N/A 7/30/2015    C3-6 FUSION CERVICAL ANTERIOR - Dr Morris     PE TUBES      PE tubes x 5     STRESS ECHO (METRO)  1/08    normal     SURGICAL HISTORY OF -   12/08    Rt Ear  cholesteoma/tympanoplasty dr pretty     SURGICAL HISTORY OF -       dr patton - large lt foot ganglion cyst     TONSILLECTOMY & ADENOIDECTOMY  ,       Social History     Tobacco Use     Smoking status: Former Smoker     Packs/day: 2.00     Years: 32.00     Pack years: 64.00     Types: Cigarettes     Last attempt to quit: 2009     Years since quitting: 10.1     Smokeless tobacco: Never Used   Substance Use Topics     Alcohol use: No     Family History   Problem Relation Age of Onset     Cardiovascular Father         First MI at 61, dies of MI at age 65     Chronic Obstructive Pulmonary Disease Mother      Macular Degeneration Mother      Breast Cancer Maternal Grandmother      Coronary Artery Disease Paternal Grandmother          at 50     Cerebrovascular Disease Paternal Grandfather          Current Outpatient Medications   Medication Sig Dispense Refill     aspirin 81 MG EC tablet Take 1 tablet (81 mg) by mouth daily 90 tablet 3     azithromycin (ZITHROMAX) 250 MG tablet Take 2 tablets (500 mg) by mouth daily for 1 day, THEN 1 tablet (250 mg) daily for 4 days. 6 tablet 0     cyclobenzaprine (FLEXERIL) 10 MG tablet Take 1 tablet (10 mg) by mouth 3 times daily as needed for muscle spasms 30 tablet 0     losartan-hydrochlorothiazide (HYZAAR) 100-12.5 MG tablet Take 1 tablet by mouth daily 90 tablet 3     predniSONE (DELTASONE) 20 MG tablet Take 60 mg daily for 3 days, then 40 mg daily for 3 days, then 20 mg daily for 3 days, then 10 mg for 4 days. 20 tablet 0     Allergies   Allergen Reactions     Penicillins Unknown     Severe - ICU at age 1       Reviewed and updated as needed this visit by clinical staff       Reviewed and updated as needed this visit by Provider         ROS:  Constitutional, HEENT, cardiovascular, pulmonary, GI, , musculoskeletal, neuro, skin, endocrine and psych systems are negative, except as otherwise noted.    OBJECTIVE:     /72   Pulse 103   Temp 99.3  F (37.4   C) (Oral)   Wt 105.2 kg (232 lb)   SpO2 93%   BMI 35.28 kg/m    Body mass index is 35.28 kg/m .  GENERAL: healthy, alert and no distress  EYES: Eyes grossly normal to inspection, PERRL and conjunctivae and sclerae normal  HENT: ear canals and TM's normal, nose and mouth without ulcers or lesions  NECK: no adenopathy  RESP:ilateral crackles noted in lower lung fields  CV: regular rate and rhythm, normal S1 S2, no S3 or S4, no murmur  MS: no gross musculoskeletal defects noted, no edema  SKIN: no suspicious lesions or rashes  NEURO: Normal strength and tone, mentation intact and speech normal  PSYCH: mentation appears normal, affect normal/bright    Diagnostic Test Results:  CXR -patchy appearing infiltrate noted in RLL    ASSESSMENT/PLAN:       2. SOB (shortness of breath)  - XR Chest 2 Views; Future    3. Acute bilateral low back pain with right-sided sciatica  4/5 strength of RLE compared with left.  Symptoms are consistent with sciatica.  Patient has been experiencing this off and on for months with significant limitation in ROM due to pain.  Will further assess with MRI  - MR Lumbar Spine w/o Contrast; Future  - predniSONE (DELTASONE) 20 MG tablet; Take 60 mg daily for 3 days, then 40 mg daily for 3 days, then 20 mg daily for 3 days, then 10 mg for 4 days.  Dispense: 20 tablet; Refill: 0  - cyclobenzaprine (FLEXERIL) 10 MG tablet; Take 1 tablet (10 mg) by mouth 3 times daily as needed for muscle spasms  Dispense: 30 tablet; Refill: 0    4. Pneumonia of right lower lobe due to infectious organism (H)  - azithromycin (ZITHROMAX) 250 MG tablet; Take 2 tablets (500 mg) by mouth daily for 1 day, THEN 1 tablet (250 mg) daily for 4 days.  Dispense: 6 tablet; Refill: 0    Follow-Up: 1 wek if persistent symptoms, sooner if worsening    Kris Ochoa PA-C  [facility]

## 2019-02-20 ENCOUNTER — TELEPHONE (OUTPATIENT)
Dept: EDUCATION SERVICES | Facility: CLINIC | Age: 59
End: 2019-02-20

## 2019-02-20 DIAGNOSIS — E11.9 TYPE 2 DIABETES MELLITUS WITHOUT COMPLICATION, WITHOUT LONG-TERM CURRENT USE OF INSULIN (H): ICD-10-CM

## 2019-02-20 DIAGNOSIS — R73.03 PREDIABETES: Primary | ICD-10-CM

## 2019-02-20 LAB
BACTERIA SPEC CULT: NORMAL
SPECIMEN SOURCE: NORMAL

## 2019-02-20 NOTE — TELEPHONE ENCOUNTER
"Patient is scheduled to see diabetes education on 2/21/19, however does not currently have a referral in place. Please order a \"Diabetes Educator Referral\" in the  section of this telephone encounter if you are in agreement with this appointment.    Thank you!    DINESH Salgado CDE    "

## 2019-02-22 ENCOUNTER — OFFICE VISIT (OUTPATIENT)
Dept: FAMILY MEDICINE | Facility: CLINIC | Age: 59
End: 2019-02-22
Payer: COMMERCIAL

## 2019-02-22 VITALS
HEART RATE: 91 BPM | WEIGHT: 303 LBS | SYSTOLIC BLOOD PRESSURE: 156 MMHG | TEMPERATURE: 99.1 F | OXYGEN SATURATION: 93 % | BODY MASS INDEX: 46.07 KG/M2 | DIASTOLIC BLOOD PRESSURE: 98 MMHG

## 2019-02-22 DIAGNOSIS — J18.9 PNEUMONIA DUE TO INFECTIOUS ORGANISM, UNSPECIFIED LATERALITY, UNSPECIFIED PART OF LUNG: Primary | ICD-10-CM

## 2019-02-22 PROCEDURE — 99213 OFFICE O/P EST LOW 20 MIN: CPT | Performed by: PHYSICIAN ASSISTANT

## 2019-02-22 RX ORDER — CODEINE PHOSPHATE AND GUAIFENESIN 10; 100 MG/5ML; MG/5ML
2 SOLUTION ORAL EVERY 4 HOURS PRN
Qty: 120 ML | Refills: 0 | Status: SHIPPED | OUTPATIENT
Start: 2019-02-22 | End: 2019-03-22

## 2019-02-22 RX ORDER — BENZONATATE 100 MG/1
100 CAPSULE ORAL 3 TIMES DAILY PRN
Qty: 30 CAPSULE | Refills: 0 | Status: SHIPPED | OUTPATIENT
Start: 2019-02-22 | End: 2019-03-22

## 2019-02-22 RX ORDER — ALBUTEROL SULFATE 90 UG/1
2 AEROSOL, METERED RESPIRATORY (INHALATION) EVERY 6 HOURS
Qty: 18 G | Refills: 0 | Status: SHIPPED | OUTPATIENT
Start: 2019-02-22 | End: 2020-03-13

## 2019-02-22 NOTE — PROGRESS NOTES
SUBJECTIVE:   Yannick Jean is a 58 year old male who presents to clinic today for the following health issues:      Acute Illness   Acute illness concerns: Persistent Cough  Onset: 10 days;  He was seen on the 19th of Feb as well    Fever: no     Chills/Sweats: no     Headache (location?): YES    Sinus Pressure:no    Conjunctivitis:  no    Ear Pain: no    Rhinorrhea: no     Congestion: YES    Sore Throat: no      Cough: YES    Wheeze: YES    Decreased Appetite: YES    Nausea: no     Vomiting: no     Diarrhea:  no     Dysuria/Freq.: no     Fatigue/Achiness: YES    Sick/Strep Exposure: no      Therapies Tried and outcome: antibiotic; doesn't seem to be helping    Frankie presents to the clinic for reevaluation for persistent cough for which he was seen on 02/19/2019 and started on z pack for supposed PNA. Since our last visit, he continues to have a persistent, constant cough, and wheezing.  Cough is episodic and is keeping him from sleeping.  He has also been using prednisone for his sciatica which has helped his SOB slightly.   No new fevers, or worsening of his SOB          Problem list and histories reviewed & adjusted, as indicated.  Additional history: as documented    Patient Active Problem List   Diagnosis     ED (erectile dysfunction)     Hyperplastic colonic polyp     Hypertension goal BP (blood pressure) < 140/90     Cervical pain     S/P cervical spinal fusion     Advanced directives, counseling/discussion     CKD (chronic kidney disease) stage 2, GFR 60-89 ml/min     Bell's palsy     Prediabetes     Hyperlipidemia LDL goal <100     Morbid obesity (H)     Diabetes mellitus, type 2 (H)     Past Surgical History:   Procedure Laterality Date     COLONOSCOPY  6/09    hyperplastic - due 10 yrs     FUSION CERVICAL ANTERIOR THREE+ LEVELS N/A 7/30/2015    C3-6 FUSION CERVICAL ANTERIOR - Dr Morris     PE TUBES      PE tubes x 5     STRESS ECHO (METRO)  1/08    normal     SURGICAL HISTORY OF -   12/08    Rt Ear  cholesteoma/tympanoplasty dr pretty     SURGICAL HISTORY OF -       dr patton - large lt foot ganglion cyst     TONSILLECTOMY & ADENOIDECTOMY  1963,       Social History     Tobacco Use     Smoking status: Former Smoker     Packs/day: 2.00     Years: 32.00     Pack years: 64.00     Types: Cigarettes     Last attempt to quit: 2009     Years since quitting: 10.1     Smokeless tobacco: Never Used   Substance Use Topics     Alcohol use: No     Family History   Problem Relation Age of Onset     Cardiovascular Father         First MI at 61, dies of MI at age 65     Chronic Obstructive Pulmonary Disease Mother      Macular Degeneration Mother      Breast Cancer Maternal Grandmother      Coronary Artery Disease Paternal Grandmother          at 50     Cerebrovascular Disease Paternal Grandfather          Current Outpatient Medications   Medication Sig Dispense Refill     albuterol (PROAIR HFA/PROVENTIL HFA/VENTOLIN HFA) 108 (90 Base) MCG/ACT inhaler Inhale 2 puffs into the lungs every 6 hours 18 g 0     aspirin 81 MG EC tablet Take 1 tablet (81 mg) by mouth daily 90 tablet 3     azithromycin (ZITHROMAX) 250 MG tablet Take 2 tablets (500 mg) by mouth daily for 1 day, THEN 1 tablet (250 mg) daily for 4 days. 6 tablet 0     benzonatate (TESSALON) 100 MG capsule Take 1 capsule (100 mg) by mouth 3 times daily as needed for cough 30 capsule 0     cyclobenzaprine (FLEXERIL) 10 MG tablet Take 1 tablet (10 mg) by mouth 3 times daily as needed for muscle spasms 30 tablet 0     guaiFENesin-codeine (ROBITUSSIN AC) 100-10 MG/5ML solution Take 10 mLs by mouth every 4 hours as needed for cough 120 mL 0     losartan-hydrochlorothiazide (HYZAAR) 100-12.5 MG tablet Take 1 tablet by mouth daily 90 tablet 3     predniSONE (DELTASONE) 20 MG tablet Take 60 mg daily for 3 days, then 40 mg daily for 3 days, then 20 mg daily for 3 days, then 10 mg for 4 days. 20 tablet 0     Allergies   Allergen Reactions     Penicillins Unknown      Severe - ICU at age 1       Reviewed and updated as needed this visit by clinical staff       Reviewed and updated as needed this visit by Provider         ROS:  Constitutional, HEENT, cardiovascular, pulmonary, gi and gu systems are negative, except as otherwise noted.    OBJECTIVE:     BP (!) 156/98   Pulse 91   Temp 99.1  F (37.3  C) (Oral)   Wt 137.4 kg (303 lb)   SpO2 93%   BMI 46.07 kg/m    Body mass index is 46.07 kg/m .  GENERAL: healthy, alert and no distress  EYES: Eyes grossly normal to inspection  HENT: ear canals and TM's normal, nose and mouth without ulcers or lesions  NECK: no adenopathy  RESP: fine crackles bilaterally in lower lung fields  CV: regular rate and rhythm, normal S1 S2, no S3 or S4, no murmur    Diagnostic Test Results:  none     ASSESSMENT/PLAN:       1. Pneumonia due to infectious organism, unspecified laterality, unspecified part of lung  X ray was read as normal by the radiologist at our last visit.  Still, he continues to have lower lung crackles and 02 sats only 93% on RA.  Today, will add Robitussin AC and tessalon PRN for cough and inhaler for bouts of wheezing and coughing.  He is agreeable.   - guaiFENesin-codeine (ROBITUSSIN AC) 100-10 MG/5ML solution; Take 10 mLs by mouth every 4 hours as needed for cough  Dispense: 120 mL; Refill: 0  - benzonatate (TESSALON) 100 MG capsule; Take 1 capsule (100 mg) by mouth 3 times daily as needed for cough  Dispense: 30 capsule; Refill: 0  - albuterol (PROAIR HFA/PROVENTIL HFA/VENTOLIN HFA) 108 (90 Base) MCG/ACT inhaler; Inhale 2 puffs into the lungs every 6 hours  Dispense: 18 g; Refill: 0    Follow-Up: 4 days if persistent symptoms      Kris Ochoa PA-C  [facility]

## 2019-03-12 ENCOUNTER — TELEPHONE (OUTPATIENT)
Dept: FAMILY MEDICINE | Facility: CLINIC | Age: 59
End: 2019-03-12

## 2019-03-12 NOTE — TELEPHONE ENCOUNTER
Date Forms was received: March 12, 2019    Forms received by: Fax    Purpose of Form:  FMLA  For caring for wife    How the form needs to be returned for patient:  Fax    Form currently placed  [place]

## 2019-03-12 NOTE — TELEPHONE ENCOUNTER
Attempt # 1    Called #   Telephone Information:   Mobile [phone]         Left a non detailed VM     Rita Gilmore RN, BSN  [facility]

## 2019-03-12 NOTE — TELEPHONE ENCOUNTER
There is a section the patient needs to complete.  Copy made, original brought to front for patient to .  RN needs to call patient on alternate encounter, she will notify Frankie at that time.    Ranjana Thibodeaux

## 2019-03-12 NOTE — TELEPHONE ENCOUNTER
Frankie called back, RN not available. Advised pt that form is ready at  for , but there is a section that he needs to complete.    Frankie said he won't be able to  today, but he will come in within the next couple of days.    Mary Santos  Patient Representative

## 2019-03-13 NOTE — TELEPHONE ENCOUNTER
Pt came in today. Brought filled out forms in. I faxed them to [facility] for him  Joyce Ayala, Clinic Receptionist

## 2019-03-21 ENCOUNTER — ALLIED HEALTH/NURSE VISIT (OUTPATIENT)
Dept: EDUCATION SERVICES | Facility: CLINIC | Age: 59
End: 2019-03-21
Payer: COMMERCIAL

## 2019-03-21 ENCOUNTER — ALLIED HEALTH/NURSE VISIT (OUTPATIENT)
Dept: NURSING | Facility: CLINIC | Age: 59
End: 2019-03-21

## 2019-03-21 VITALS
DIASTOLIC BLOOD PRESSURE: 82 MMHG | WEIGHT: 219.1 LBS | SYSTOLIC BLOOD PRESSURE: 118 MMHG | HEART RATE: 62 BPM | BODY MASS INDEX: 33.31 KG/M2

## 2019-03-21 DIAGNOSIS — I10 HYPERTENSION GOAL BP (BLOOD PRESSURE) < 140/90: Primary | ICD-10-CM

## 2019-03-21 DIAGNOSIS — E11.9 TYPE 2 DIABETES MELLITUS WITHOUT COMPLICATION, WITHOUT LONG-TERM CURRENT USE OF INSULIN (H): Primary | ICD-10-CM

## 2019-03-21 PROCEDURE — G0108 DIAB MANAGE TRN  PER INDIV: HCPCS

## 2019-03-21 NOTE — PROGRESS NOTES
"Diabetes Self-Management Education & Support    Diabetes Education Self Management & Training    SUBJECTIVE/OBJECTIVE:  Presents for: Individual Review  Accompanied by: Self  Focus of Visit: Healthy Eating  Diabetes type: Type 2  Cultural Influences/Ethnic Background:  American    Diabetes Symptoms & Complications          Patient Problem List and Family Medical History reviewed for relevant medical history, current medical status, and diabetes risk factors.    Vitals:  There were no vitals taken for this visit.  Estimated body mass index is 46.07 kg/m  as calculated from the following:    Height as of 1/30/19: 1.727 m (5' 8\").    Weight as of 2/22/19: 137.4 kg (303 lb).   Last 3 BP:   BP Readings from Last 3 Encounters:   02/22/19 (!) 156/98   02/19/19 132/72   01/30/19 144/88       History   Smoking Status     Former Smoker     Packs/day: 2.00     Years: 32.00     Types: Cigarettes     Quit date: 1/1/2009   Smokeless Tobacco     Never Used       Labs:  Lab Results   Component Value Date    A1C 6.5 01/18/2019     Lab Results   Component Value Date    GLC 98 01/18/2019     Lab Results   Component Value Date     01/18/2019     HDL Cholesterol   Date Value Ref Range Status   01/18/2019 40 >39 mg/dL Final   ]  GFR Estimate   Date Value Ref Range Status   01/18/2019 82 >60 mL/min/[1.73_m2] Final     Comment:     Non  GFR Calc  Starting 12/18/2018, serum creatinine based estimated GFR (eGFR) will be   calculated using the Chronic Kidney Disease Epidemiology Collaboration   (CKD-EPI) equation.       GFR Estimate If Black   Date Value Ref Range Status   01/18/2019 >90 >60 mL/min/[1.73_m2] Final     Comment:      GFR Calc  Starting 12/18/2018, serum creatinine based estimated GFR (eGFR) will be   calculated using the Chronic Kidney Disease Epidemiology Collaboration   (CKD-EPI) equation.       Lab Results   Component Value Date    CR 0.99 01/18/2019     No results found for: " MICROALBUMIN    Healthy Eating  Healthy Eating Assessed Today: Yes  Patient on a regular basis: Eats 3 meals a day  Meal planning: Avoiding sweets(very low carb, 50 grams per day)  Meals include: Breakfast, Lunch, Dinner, Snacks  Breakfast: eggs, milk  Lunch: leftovers from dinner   Dinner: ahi tuna with some potato OR steak with asparagus, riced cauliflower  Snacks: greek yogurt  Beverages: Milk, Water  Has patient met with a dietitian in the past?: No    Being Active  Being Active Assessed Today: Yes  Exercise:: Currently not exercising  Barrier to exercise: Physical limitation    Monitoring  Monitoring Assessed Today: No    Does not want to have to check blood sugars    Taking Medications      Taking Medication Assessed Today: No    Problem Solving  Problem Solving Assessed Today: No    Reducing Risks  Reducing Risks Assessed Today: Yes  Diabetes Risks: Age over 45 years, Hyperlipidemia, Sedentary Lifestyle  CAD Risks: Male sex, Hypertension, Sedentary lifestyle, Obesity    Healthy Coping  Healthy Coping Assessed Today: Yes  Emotional response to diabetes: Ready to learn  Informal Support system:: Family, Friends  Stage of change: PREPARATION (Decided to change - considering how)  Patient Activation Measure Survey Score:  No flowsheet data found.    ASSESSMENT:  Frankie does not want to move to having diabetes, and was worried when his A1C climbed to 6.5. He has since lost 17# by not eating out as much (was previously going to fast food every night with his wife and eating 2-3 entrees). He has been cooking for himself, trying to add in vegetables, and eat only 50 grams carbohydrate per meal. Is reading food labels for calories and carbohydrates. Also is somewhat experimenting with intermittent fasting (1 day a week, 18 hour fast), which is also helping him lose ~1#/week.       Goals Addressed as of 3/21/2019 at 9:14 AM       Healthy Eating (pt-stated)     Added 3/21/19 by Stormy Feliciano RD     My Goal: I will eat  close to 30 grams carbohydrate per meal    What I need to meet my goal: add in fruits, some starches    I plan to meet my goal by this date: 3/30/19            Patient's most recent   Lab Results   Component Value Date    A1C 6.5 01/18/2019    is meeting goal of <7.0    INTERVENTION:   Diabetes knowledge and skills assessment:     Patient is knowledgeable in diabetes management concepts related to: Healthy Eating, Being Active and Reducing Risks    Patient needs further education on the following diabetes management concepts: None currently    Based on learning assessment above, most appropriate setting for further diabetes education would be: Group class or Individual setting.    Education provided today on:  AADE Self-Care Behaviors:  Healthy Eating: consistency in amount, composition, and timing of food intake, weight reduction, heart healthy diet and portion control  Being Active: relationship to blood glucose  Healthy Coping: benefits of making appropriate lifestyle changes    Opportunities for ongoing education and support in diabetes-self management were discussed.    Pt verbalized understanding of concepts discussed and recommendations provided today.       Education Materials Provided:  No new materials provided today    PLAN:  See Patient Instructions for co-developed, patient-stated behavior change goals.  AVS printed and provided to patient today. See Follow-Up section for recommended follow-up.    DINESH Salgado CDE  Time Spent: 30 minutes  Encounter Type: Individual    Any diabetes medication dose changes were made via the CDE Protocol and Collaborative Practice Agreement with the patient's referring provider. A copy of this encounter was shared with the provider.

## 2019-03-21 NOTE — PATIENT INSTRUCTIONS
Work on increasing carbohydrates to 30 grams per meal while decreasing saturated fat (leaner cuts of meat, less butter or coconut oil, etc)

## 2019-03-21 NOTE — PROGRESS NOTES
Patient was in clinic for diabetic ed appointment and noted that he had lost 17lb in the last few months and his BP has been steadily dropping.   At the end of 01/2019 - BP was 144/88, Feb 2019 BP ranged from 119-132/78-89 Pulse 72-91. March 2019 BP ranged from /74-83 Pulse 66-69    Hypertension Follow-up      Outpatient blood pressures are being checked at home.  Results are /74-88.    Low Salt Diet: not monitoring salt      Amount of exercise or physical activity: None    Problems taking medications regularly: No    Medication side effects: none    Diet: carbohydrate counting    DENIES: CP, SOB, Difficulty Breathing, Dizziness/Lightheadedness, Numbness/Tingling, HA, Vision/Hearing Changes, N/V    BP today was 118-82. Pulse 62     Patient currently taking losartan-hydrochlorothiazide (HYZAAR) 100-12.5 MG tablet - 1 tab PO daily since 01/18/2019    Message handled by Nurse Triage with [facility] - provider name: MD REID - keep bp med same, recheck next week.  RN BP check scheduled for 3/27/2019.   Patient stated an understanding and agreed with plan.      Patient recorded weights, BP, Pulse sheet sent to scanning.      Brittnee Aguirre RN  [facility]

## 2019-03-22 ENCOUNTER — OFFICE VISIT (OUTPATIENT)
Dept: URGENT CARE | Facility: URGENT CARE | Age: 59
End: 2019-03-22
Payer: COMMERCIAL

## 2019-03-22 ENCOUNTER — ANCILLARY PROCEDURE (OUTPATIENT)
Dept: GENERAL RADIOLOGY | Facility: CLINIC | Age: 59
End: 2019-03-22
Attending: PHYSICIAN ASSISTANT
Payer: COMMERCIAL

## 2019-03-22 VITALS
HEART RATE: 98 BPM | WEIGHT: 217 LBS | DIASTOLIC BLOOD PRESSURE: 80 MMHG | SYSTOLIC BLOOD PRESSURE: 123 MMHG | HEIGHT: 69 IN | TEMPERATURE: 97.9 F | BODY MASS INDEX: 32.14 KG/M2

## 2019-03-22 DIAGNOSIS — M79.645 PAIN OF FINGER OF LEFT HAND: ICD-10-CM

## 2019-03-22 DIAGNOSIS — S61.211A LACERATION OF LEFT INDEX FINGER WITHOUT FOREIGN BODY WITHOUT DAMAGE TO NAIL, INITIAL ENCOUNTER: Primary | ICD-10-CM

## 2019-03-22 PROCEDURE — 99214 OFFICE O/P EST MOD 30 MIN: CPT | Mod: 25 | Performed by: PHYSICIAN ASSISTANT

## 2019-03-22 PROCEDURE — 73140 X-RAY EXAM OF FINGER(S): CPT | Mod: LT

## 2019-03-22 PROCEDURE — 12002 RPR S/N/AX/GEN/TRNK2.6-7.5CM: CPT | Performed by: PHYSICIAN ASSISTANT

## 2019-03-22 RX ORDER — SULFAMETHOXAZOLE/TRIMETHOPRIM 800-160 MG
1 TABLET ORAL 2 TIMES DAILY
Qty: 20 TABLET | Refills: 0 | Status: SHIPPED | OUTPATIENT
Start: 2019-03-22 | End: 2019-04-01

## 2019-03-22 ASSESSMENT — MIFFLIN-ST. JEOR: SCORE: 1784.69

## 2019-03-22 NOTE — PROGRESS NOTES
SUBJECTIVE:   Yannick Jean is a 58 year old male presenting with a chief complaint of   Chief Complaint   Patient presents with     Finger     2nd finger left hand. caught in a wench.        He is an established patient of [facility].    Laceration    Mechanism of injury: 2nd finger left hand caught in a wench of a four-lyles  History provided by: Patient  Time of injury was 2 hours(s) ago.    This is a non-work related injury.    Associated symptoms: Denies numbness, weakness, or loss of function    Last tetanus booster within 10 years: Yes    LACERATION EXAM:   Size of laceration: 2 centimeters  Characteristics of the laceration: dirty, linear and bleeding  Depth of laceration: deep  Tendon function intact: Yes  Sensation to light touch intact: Yes  Pulses/capillary refill intact: Yes  Foreign body: No     Size of laceration: 1 centimeters  Characteristics of the laceration: dirty and linear  Depth of laceration: superficial  Tendon function intact: Yes  Sensation to light touch intact: Yes  Pulses/capillary refill intact: Yes  Foreign body: No    Size of laceration: 3 centimeters  Characteristics of the laceration: flap-like  Depth of laceration: superficial & deep  Tendon function intact: Yes  Sensation to light touch intact: Yes  Pulses/capillary refill intact: Yes  Foreign body: No    Picture included in patient's chart: no    PROCEDURE NOTE:  Anesthesia: Wound was locally injected with 4 cc's of  Lidocaine 1% plain  Prepped and draped in the usual sterile fashion  Wound was thoroughly irrigated with use of sterile water and syringe  Wound was explored for any foreign bodies and evaluated for tendon, nerve, vessel or joint involvement. No foreign bodies were appreciated. Tendon, nerve, and vessel function was deemed to be intact.    Closure was simple  2 cm linear laceration was closed with 3 X 5.0 Nylon interrupted sutures  1 cm linear laceration was closed with 1 X 5.0 Nylon interrupted sutures  3 cm  flap laceration was closed on the medial edge with 1 X 5.0 Nylon interrupted suture. Unable to close the remainder of the 3 cm flap laceration due to degradation of tissue.   The wound was dressed, bandage was applied, finger splint provided for comfort and to encourage patient not to use the injured finger.   Patient tolerated the procedure well.    Review of Systems   ROS: 10 point ROS neg other than the symptoms noted above in the HPI.    Past Medical History:   Diagnosis Date     Bell's palsy 2017     Cervical stenosis of spine 5/15    central and foraminal - moderate to severe     CKD (chronic kidney disease) stage 2, GFR 60-89 ml/min 2017     Colon polyps, hyperplastic 6/09    x 4 - due      Diabetes mellitus, type 2 (H) 2019     ED (erectile dysfunction)     dr atkinson     Hyperlipidemia LDL goal <100 2018     Hypertension goal BP (blood pressure) < 140/90      Mixed hearing loss     Dr Lau - [facility]     Prediabetes 2017     Tobacco use disorder     quit      Family History   Problem Relation Age of Onset     Cardiovascular Father         First MI at 61, dies of MI at age 65     Chronic Obstructive Pulmonary Disease Mother      Macular Degeneration Mother      Breast Cancer Maternal Grandmother      Coronary Artery Disease Paternal Grandmother          at 50     Cerebrovascular Disease Paternal Grandfather      Current Outpatient Medications   Medication Sig Dispense Refill     albuterol (PROAIR HFA/PROVENTIL HFA/VENTOLIN HFA) 108 (90 Base) MCG/ACT inhaler Inhale 2 puffs into the lungs every 6 hours 18 g 0     aspirin 81 MG EC tablet Take 1 tablet (81 mg) by mouth daily 90 tablet 3     losartan-hydrochlorothiazide (HYZAAR) 100-12.5 MG tablet Take 1 tablet by mouth daily 90 tablet 3     sulfamethoxazole-trimethoprim (BACTRIM DS/SEPTRA DS) 800-160 MG tablet Take 1 tablet by mouth 2 times daily for 10 days 20 tablet 0     Social History     Tobacco Use     Smoking status: Former  "Smoker     Packs/day: 2.00     Years: 32.00     Pack years: 64.00     Types: Cigarettes     Last attempt to quit: 1/1/2009     Years since quitting: 10.2     Smokeless tobacco: Never Used   Substance Use Topics     Alcohol use: No       OBJECTIVE  /80 (BP Location: Right arm, Patient Position: Sitting, Cuff Size: Adult Large)   Pulse 98   Temp 97.9  F (36.6  C) (Oral)   Ht 1.753 m (5' 9\")   Wt 98.4 kg (217 lb)   BMI 32.05 kg/m      Physical Exam   Constitutional: He is oriented to person, place, and time. He appears well-developed and well-nourished. No distress.   HENT:   Head: Normocephalic and atraumatic.   Eyes: EOM are normal.   Pulmonary/Chest: Effort normal.   Musculoskeletal: Normal range of motion.        Hands:  Three small lacerations to the left second finger as shown in the illustration. 2 cm linear laceration just proximal to the PIP. 1 cm linear laceration just distal to the PIP. And 3 cm flap-like laceration distal to the PIP.    Neurological: He is alert and oriented to person, place, and time.   Skin: Skin is warm and dry. He is not diaphoretic.       Labs:  No results found for this or any previous visit (from the past 24 hour(s)).    X-Ray was done, formal read: No apparent fracture or subluxation. 0.1 cm calcification or radiopaque foreign body projecting along the ulnar aspect of the index finger proximal phalanx.    ASSESSMENT:      ICD-10-CM    1. Laceration of left index finger without foreign body without damage to nail, initial encounter S61.211A sulfamethoxazole-trimethoprim (BACTRIM DS/SEPTRA DS) 800-160 MG tablet   2. Pain of finger of left hand M79.645 XR Finger Left G/E 2 Views        Medical Decision Making:    Differential Diagnosis:  MS Injury Pain: fracture, contusion and laceration. Patient without sensation of foreign body prior to local digital nerve block. After X-ray, wound was thorough irrigated with sterile water under pressure and then explored. No foreign body " appreciated. The two linear lacerations were closed and well approximated. The flap-like laceration was closed at the superior corner with one suture, but the remainder of the flap was unable to be sewn due to degradation of skin.     Serious Comorbid Conditions:  Adult:  Pre-diabetes    PLAN:    Laceration:    Keep wound clean and dry for the next 24-48 hours  Ok to shower and get wound wet after 48 hours, but do not soak for 2 weeks  Wear finger splint to encourage rest and wound healing  Wound follow-up: Return for suture removal in 7 days  May return to work/school as long as wound is kept clean and dry  Discussed the probability of scarring  Active range of motion exercises encouraged for finger lacerations  Given the wound was contaminated, will start patient on Bactrim x 10 days     Patient will return immediately if they experience redness around the laceration, drainage, worsening pain, or fever.        Followup:    If not improving or if condition worsens, follow up with your Primary Care Provider, If not improving or if conditions worsens over the next 12-24 hours, go to the Emergency Department    Patient Instructions     Patient Education      * Laceration (All Closures)  A laceration is a cut through the skin. This will usually require stitches (sutures) or staples if it is deep. Minor cuts may be treated with a tape closure ( Steri-Strips ) or Dermabond skin glue.    Home Care:  PAIN MEDICINE: You may use acetaminophen (Tylenol) 650-1000 mg every 6 hours or ibuprofen (Motrin, Advil) 600 mg every 6-8 hours with food to control pain, if you are able to take these medicines. [NOTE: If you have chronic liver or kidney disease or ever had a stomach ulcer or GI bleeding, talk with your doctor before using these medicines.]  EXTREMITY, FACE or TRUNK WOUNDS:    Keep the wound clean and dry. If a bandage was applied and it becomes wet or dirty, replace it. Otherwise, leave it in place for the first 24  hours.    If stitches or staples were used, clean the wound daily. Protect the wound from sunlight and tanning lamps.    After removing the bandage, wash the area with soap and water. Use a wet cotton swab (Q tip) to loosen and remove any blood or crust that forms.    After cleaning, apply a thin layer of Polysporin or Bacitracin ointment. This will keep the wound clean and make it easier to remove the stitches or staples. Reapply a fresh bandage.    You may remove the bandage to shower as usual after the first 24 hours, but do not soak the area in water (no swimming) until the stitches or staples are removed.    If Steri-Strips were used, keep the area clean and dry. If it becomes wet, blot it dry with a towel. It is okay to take a brief shower, but avoid scrubbing the area.    If Dermabond skin adhesive was used, do not scratch, rub or pick at the adhesive film. Do not place tape directly over the film. Do not apply liquid, ointment or creams to the wound while the film is in place. Do not clean the wound with peroxide and do not apply ointments. Avoid activities that cause heavy sweating until the film has fallen off. Protect the wound from prolonged exposure to sunlight or tanning lamps. You may shower as usual but do not soak the wound in water (no baths or swimming). The film will fall off by itself in 5-10 days.  SCALP WOUNDS: During the first two days, you may carefully rinse your hair in the shower to remove blood, glass or dirt particles. After two days, you may shower and shampoo your hair normally. Do not soak your scalp in the tub or go swimming until the stitches or staples have been removed.  MOUTH WOUNDS: Eat soft foods to reduce pain. If the cut is inside of your mouth, clean by rinsing after each meal and at bedtime with a mixture of equal parts water and Hydrogen Peroxide (do not swallow!). Or, you can use a cotton swab to directly apply Hydrogen Peroxide onto the cut.  After the wound is done  healing, use sunscreen over the area whenever exposed for the next 6 minths to avoid a darker scar.  Follow Up:  Most skin wounds heal within ten days. Mouth and facial wounds heal within five days. However, even with proper treatment, a wound infection may sometimes occur. Therefore, you should check the wound daily for signs of infection listed below.  Stitches should be removed from the face within five days; stitches and staples should be removed from other parts of the body within 7-10 days. Unless you are told to come back to the emergency room, you may have your doctor or urgent care remove the stitches. If dissolving stitches were used in the mouth, these will fall out or dissolve without the need for removal. If tape closures ( Steri-Strips ) were used, remove them yourself if they have not fallen off after 7 days. If Dermabond skin glue was used, the film will fall off by itself in 5-10 days.   Get Prompt Medical Attention  if any of the following occur:    Increasing pain in the wound    Redness, swelling or pus coming from the wound    Fever over 101 F (38.3 C) oral    If stitches or staples come apart or fall out or if Steri-Strips fall off before seven days    If the wound edges re-open    Bleeding not controlled by direct pressure    7092-9228 [employer]. [address] [place] [address], [place] [address]. All rights reserved. This information is not intended as a substitute for professional medical care. Always follow your healthcare professional's instructions.  This information has been modified by your health care provider with permission from the publisher.  Modifications clinically reviewed by Dr. Robi Aguirre on 7/20/18.           Cody Galvez PA-C

## 2019-03-23 NOTE — PATIENT INSTRUCTIONS
Patient Education      * Laceration (All Closures)  A laceration is a cut through the skin. This will usually require stitches (sutures) or staples if it is deep. Minor cuts may be treated with a tape closure ( Steri-Strips ) or Dermabond skin glue.    Home Care:  PAIN MEDICINE: You may use acetaminophen (Tylenol) 650-1000 mg every 6 hours or ibuprofen (Motrin, Advil) 600 mg every 6-8 hours with food to control pain, if you are able to take these medicines. [NOTE: If you have chronic liver or kidney disease or ever had a stomach ulcer or GI bleeding, talk with your doctor before using these medicines.]  EXTREMITY, FACE or TRUNK WOUNDS:    Keep the wound clean and dry. If a bandage was applied and it becomes wet or dirty, replace it. Otherwise, leave it in place for the first 24 hours.    If stitches or staples were used, clean the wound daily. Protect the wound from sunlight and tanning lamps.    After removing the bandage, wash the area with soap and water. Use a wet cotton swab (Q tip) to loosen and remove any blood or crust that forms.    After cleaning, apply a thin layer of Polysporin or Bacitracin ointment. This will keep the wound clean and make it easier to remove the stitches or staples. Reapply a fresh bandage.    You may remove the bandage to shower as usual after the first 24 hours, but do not soak the area in water (no swimming) until the stitches or staples are removed.    If Steri-Strips were used, keep the area clean and dry. If it becomes wet, blot it dry with a towel. It is okay to take a brief shower, but avoid scrubbing the area.    If Dermabond skin adhesive was used, do not scratch, rub or pick at the adhesive film. Do not place tape directly over the film. Do not apply liquid, ointment or creams to the wound while the film is in place. Do not clean the wound with peroxide and do not apply ointments. Avoid activities that cause heavy sweating until the film has fallen off. Protect the wound  from prolonged exposure to sunlight or tanning lamps. You may shower as usual but do not soak the wound in water (no baths or swimming). The film will fall off by itself in 5-10 days.  SCALP WOUNDS: During the first two days, you may carefully rinse your hair in the shower to remove blood, glass or dirt particles. After two days, you may shower and shampoo your hair normally. Do not soak your scalp in the tub or go swimming until the stitches or staples have been removed.  MOUTH WOUNDS: Eat soft foods to reduce pain. If the cut is inside of your mouth, clean by rinsing after each meal and at bedtime with a mixture of equal parts water and Hydrogen Peroxide (do not swallow!). Or, you can use a cotton swab to directly apply Hydrogen Peroxide onto the cut.  After the wound is done healing, use sunscreen over the area whenever exposed for the next 6 minths to avoid a darker scar.  Follow Up:  Most skin wounds heal within ten days. Mouth and facial wounds heal within five days. However, even with proper treatment, a wound infection may sometimes occur. Therefore, you should check the wound daily for signs of infection listed below.  Stitches should be removed from the face within five days; stitches and staples should be removed from other parts of the body within 7-10 days. Unless you are told to come back to the emergency room, you may have your doctor or urgent care remove the stitches. If dissolving stitches were used in the mouth, these will fall out or dissolve without the need for removal. If tape closures ( Steri-Strips ) were used, remove them yourself if they have not fallen off after 7 days. If Dermabond skin glue was used, the film will fall off by itself in 5-10 days.   Get Prompt Medical Attention  if any of the following occur:    Increasing pain in the wound    Redness, swelling or pus coming from the wound    Fever over 101 F (38.3 C) oral    If stitches or staples come apart or fall out or if  Steri-Strips fall off before seven days    If the wound edges re-open    Bleeding not controlled by direct pressure    9085-6169 [employer]. [address] [place] [address], [place] [address]. All rights reserved. This information is not intended as a substitute for professional medical care. Always follow your healthcare professional's instructions.  This information has been modified by your health care provider with permission from the publisher.  Modifications clinically reviewed by Dr. Robi Aguirre on 7/20/18.

## 2019-03-27 ENCOUNTER — ALLIED HEALTH/NURSE VISIT (OUTPATIENT)
Dept: NURSING | Facility: CLINIC | Age: 59
End: 2019-03-27

## 2019-03-27 VITALS — DIASTOLIC BLOOD PRESSURE: 78 MMHG | HEART RATE: 72 BPM | SYSTOLIC BLOOD PRESSURE: 94 MMHG

## 2019-03-27 DIAGNOSIS — N18.2 CKD (CHRONIC KIDNEY DISEASE) STAGE 2, GFR 60-89 ML/MIN: ICD-10-CM

## 2019-03-27 DIAGNOSIS — I10 HYPERTENSION GOAL BP (BLOOD PRESSURE) < 140/90: ICD-10-CM

## 2019-03-27 DIAGNOSIS — R73.03 PREDIABETES: ICD-10-CM

## 2019-03-27 DIAGNOSIS — R80.9 PROTEINURIA, UNSPECIFIED TYPE: ICD-10-CM

## 2019-03-27 RX ORDER — LOSARTAN POTASSIUM AND HYDROCHLOROTHIAZIDE 12.5; 1 MG/1; MG/1
TABLET ORAL
Qty: 90 TABLET | Refills: 3
Start: 2019-03-27 | End: 2019-05-22

## 2019-03-27 NOTE — PROGRESS NOTES
Hypertension Follow-up      Outpatient blood pressures are being checked at home.  Results are ~100/60.    Low Salt Diet: not monitoring salt      Amount of exercise or physical activity: None    Problems taking medications regularly: No    Medication side effects: none    Diet: carbohydrate counting    DENIES: CP, SOB, Difficulty Breathing, Dizziness/Lightheadedness, Numbness/Tingling, HA, Vision/Hearing Changes, N/V    Patient currently taking losartan-hydrochlorothiazide (HYZAAR) 100-12.5 MG tablet - 1 tab PO daily since 01/18/2019    BP today:   0833 - 90/72, P 72    0845 - 94/78    Message handled by Nurse Triage with [facility] - provider name: MD REID - take 1/2 tabs losartan-hctz and recheck BP in 1-2 weeks.    Patient advised - does not need refill at this time.   Nurse Only scheduled for 4/10/19  Med List Updated    Advised patient that if new or worsening symptoms appear (reviewed new & worsening symptoms) to call the clinic or be seen in the the ER  Patient stated an understanding and agreed with plan.        Brittnee Aguirre RN  [facility]

## 2019-04-10 ENCOUNTER — ALLIED HEALTH/NURSE VISIT (OUTPATIENT)
Dept: NURSING | Facility: CLINIC | Age: 59
End: 2019-04-10

## 2019-04-10 VITALS — HEART RATE: 62 BPM | SYSTOLIC BLOOD PRESSURE: 120 MMHG | DIASTOLIC BLOOD PRESSURE: 78 MMHG

## 2019-04-10 DIAGNOSIS — I10 HYPERTENSION GOAL BP (BLOOD PRESSURE) < 140/90: Primary | ICD-10-CM

## 2019-04-10 NOTE — PROGRESS NOTES
Hypertension Follow-up      Outpatient blood pressures are being checked at home.  Results are /68-78.    Low Salt Diet: not monitoring salt      Amount of exercise or physical activity: None    Problems taking medications regularly: No    Medication side effects: none    Diet: carbohydrate counting     Patient currently taking losartan-hydrochlorothiazide (HYZAAR) 100-12.5 MG tablet - 1/2 tab PO daily    DENIES: CP, SOB, Difficulty Breathing, Dizziness/Lightheadedness, Numbness/Tingling, HA, Vision/Hearing Changes, N/V      Brittnee Aguirre RN  [facility]

## 2019-04-12 ENCOUNTER — TELEPHONE (OUTPATIENT)
Dept: FAMILY MEDICINE | Facility: CLINIC | Age: 59
End: 2019-04-12

## 2019-04-12 NOTE — TELEPHONE ENCOUNTER
Reason for Call:  Other call back    Detailed comments: Patient called back wanting to return a triage call please call patient when available.    Phone Number Patient can be reached at: Cell number on file:    Telephone Information:   Mobile [phone]       Best Time: anytime    Can we leave a detailed message on this number? YES    Call taken on 4/12/2019 at 3:40 PM by Mariola Alonso

## 2019-04-12 NOTE — TELEPHONE ENCOUNTER
Please see encounter under patient's spouse.   Closing this encounter    Brittnee Aguirre RN  [facility]

## 2019-05-22 DIAGNOSIS — N18.2 CKD (CHRONIC KIDNEY DISEASE) STAGE 2, GFR 60-89 ML/MIN: ICD-10-CM

## 2019-05-22 DIAGNOSIS — R80.9 PROTEINURIA, UNSPECIFIED TYPE: ICD-10-CM

## 2019-05-22 DIAGNOSIS — I10 HYPERTENSION GOAL BP (BLOOD PRESSURE) < 140/90: ICD-10-CM

## 2019-05-22 DIAGNOSIS — R73.03 PREDIABETES: ICD-10-CM

## 2019-05-22 RX ORDER — LOSARTAN POTASSIUM AND HYDROCHLOROTHIAZIDE 12.5; 1 MG/1; MG/1
TABLET ORAL
Qty: 45 TABLET | Refills: 3 | Status: SHIPPED | OUTPATIENT
Start: 2019-05-22 | End: 2020-03-13

## 2019-05-22 NOTE — TELEPHONE ENCOUNTER
Reason for Call:  Medication or medication refill:losartan-hydrochlorothiazide (HYZAAR) 100-12.5 MG tablet - Patient needs new RX & dosage    Do you use a [facility]?  Name of the pharmacy and phone number for the current request:  [facility] - [phone]    Name of the medication requested: Patient wants to go back to old dosage & Dr Back will know what that is. Patient says it needs to be a new RX.     Other request: New RX     Can we leave a detailed message on this number? YES    Phone number patient can be reached at: Cell number on file:    Telephone Information:   Mobile [phone]       Best Time: any      Call taken on 5/22/2019 at 11:32 AM by Michelle Ayala

## 2019-05-22 NOTE — TELEPHONE ENCOUNTER
"Requested Prescriptions   Pending Prescriptions Disp Refills     losartan-hydrochlorothiazide (HYZAAR) 100-12.5 MG tablet        Last Written Prescription Date:  3.27.19  Last Fill Quantity: 90 tablet,  # refills: 3   Last office visit: 2/22/2019 with prescribing provider:  Maxwell Back MD         Future Office Visit:       90 tablet 3     Sig: Take 0.5 tablets by mouth daily       Angiotensin-II Receptors Passed - 5/22/2019 11:37 AM        Passed - Blood pressure under 140/90 in past 12 months     BP Readings from Last 3 Encounters:   04/10/19 120/78   03/27/19 94/78   03/22/19 123/80                 Passed - Recent (12 mo) or future (30 days) visit within the authorizing provider's specialty     Patient had office visit in the last 12 months or has a visit in the next 30 days with authorizing provider or within the authorizing provider's specialty.  See \"Patient Info\" tab in inbasket, or \"Choose Columns\" in Meds & Orders section of the refill encounter.              Passed - Medication is active on med list        Passed - Patient is age 18 or older        Passed - Normal serum creatinine on file in past 12 months     Recent Labs   Lab Test 01/18/19  1227 08/16/17  1329   CR 0.99  --    CREAT  --  1.0             Passed - Normal serum potassium on file in past 12 months     Recent Labs   Lab Test 01/18/19  1227   POTASSIUM 4.7                    "

## 2019-05-24 ENCOUNTER — TELEPHONE (OUTPATIENT)
Dept: FAMILY MEDICINE | Facility: CLINIC | Age: 59
End: 2019-05-24

## 2019-05-24 NOTE — TELEPHONE ENCOUNTER
Form from [facility] stating they do not have any supply since the recall.  Pt will need 2 separate medications or order from different pharmacy.

## 2019-05-28 NOTE — TELEPHONE ENCOUNTER
Waiting for PCP to review and advise.    Patricia Hanna, BS, RN, N  [facility]) [phone]

## 2019-07-10 ENCOUNTER — TELEPHONE (OUTPATIENT)
Dept: FAMILY MEDICINE | Facility: CLINIC | Age: 59
End: 2019-07-10

## 2019-07-16 ENCOUNTER — TELEPHONE (OUTPATIENT)
Dept: SCHEDULING | Facility: CLINIC | Age: 59
End: 2019-07-16

## 2019-07-16 NOTE — TELEPHONE ENCOUNTER
7/16/2019    Call Regarding Preventive Health Screening Colonoscopy    Attempt 1    Comments:

## 2019-10-14 ENCOUNTER — TELEPHONE (OUTPATIENT)
Dept: FAMILY MEDICINE | Facility: CLINIC | Age: 59
End: 2019-10-14

## 2019-10-14 NOTE — TELEPHONE ENCOUNTER
Date Forms was received: October 14, 2019    Forms received by: Fax    Purpose of Form:  FMLA  To care for his wife    How the form needs to be returned for patient:  Fax    Form currently placed  [employer]

## 2019-10-15 NOTE — TELEPHONE ENCOUNTER
This would be done as an encounter/ appointment for Lynnette, not Frankie   (FMLA is for the person needing care).  Lynnette is scheduled for an appointment on Thursday.    Ranjana Thibodeaux

## 2019-11-13 ENCOUNTER — TELEPHONE (OUTPATIENT)
Dept: FAMILY MEDICINE | Facility: CLINIC | Age: 59
End: 2019-11-13

## 2019-11-13 DIAGNOSIS — F17.200 TOBACCO USE DISORDER: ICD-10-CM

## 2019-11-13 RX ORDER — VARENICLINE TARTRATE 1 MG/1
1 TABLET, FILM COATED ORAL 2 TIMES DAILY
Qty: 60 TABLET | Refills: 1 | Status: SHIPPED | OUTPATIENT
Start: 2019-11-13 | End: 2020-03-13

## 2019-11-13 NOTE — TELEPHONE ENCOUNTER
All ok for refill, set a d day to stop, start chantix 1 week before, follow up in clinic if any issues

## 2019-11-13 NOTE — TELEPHONE ENCOUNTER
LOV 2/22/2019      Would you like to see pt to restart this medication ?     Please review and advise     Thank you     Rita Gilmore RN, BSN  [facility]

## 2019-11-13 NOTE — TELEPHONE ENCOUNTER
Called #   Telephone Information:   Mobile [phone]     Pt stated he will stop smoking 2 weeks after starting. Pt stated he will  today and quit smoking 11/27/19, Pt stated he really wants to quite and is determined to do so.  Pt advise to follow up if any concerns arise or adverse effects. Patient stated an understanding and agreed with plan.      Fan Kwok RN   [facility]

## 2019-11-13 NOTE — TELEPHONE ENCOUNTER
Reason for Call:  Medication or medication refill:    Do you use a [facility]?  Name of the pharmacy and phone number for the current request:     [facility] [id number] [place] [facility] [address] [facility] [address]    Name of the medication requested: Chantix     Other request: Would like to stop smoking again    Can we leave a detailed message on this number? YES    Phone number patient can be reached at: Cell number on file:    Telephone Information:   Mobile [phone]       Best Time: anytime     Call taken on 11/13/2019 at 10:24 AM by Cassy Piedra

## 2019-11-27 NOTE — TELEPHONE ENCOUNTER
11/27/2019    Call Regarding Preventive Health Screening Colonoscopy       Attempt 2    Message on voicemail    Comments:       Outreach   KJ

## 2019-12-10 NOTE — TELEPHONE ENCOUNTER
12/10/2019        Patient is aware of overdue preventative health screening and will call on their own time to schedule.             Outreach ,  Craig Figueroa

## 2019-12-15 ENCOUNTER — TRANSFERRED RECORDS (OUTPATIENT)
Dept: MULTI SPECIALTY CLINIC | Facility: CLINIC | Age: 59
End: 2019-12-15

## 2019-12-15 LAB — RETINOPATHY: NORMAL

## 2020-01-20 ENCOUNTER — TELEPHONE (OUTPATIENT)
Dept: FAMILY MEDICINE | Facility: CLINIC | Age: 60
End: 2020-01-20

## 2020-01-20 NOTE — TELEPHONE ENCOUNTER
Please abstract the following data from this visit with this patient into the appropriate field in Epic:    Tests that can be patient reported without a hard copy:    Eye exam with ophthalmology on this date: 12/15/19  [facility]

## 2020-03-10 ENCOUNTER — TELEPHONE (OUTPATIENT)
Dept: FAMILY MEDICINE | Facility: CLINIC | Age: 60
End: 2020-03-10

## 2020-03-10 NOTE — TELEPHONE ENCOUNTER
Reason for Call:  Same Day Appointment, Requested Provider:  Maxwell Back MD    PCP: Maxwell Back    Reason for visit: Trouble sleeping at night , would like to be seen Friday morning if he could    Duration of symptoms: since his wife passed away    Have you been treated for this in the past? No    Additional comments:     Can we leave a detailed message on this number? YES    Phone number patient can be reached at: Cell number on file:    Telephone Information:   Mobile [phone]       Best Time:     Call taken on 3/10/2020 at 1:32 PM by Mari Raymond

## 2020-03-12 NOTE — TELEPHONE ENCOUNTER
Reason for Call:  Other     Detailed comments: The patient is calling again saying he still hasn't heard when he can get in to see Dr. Back. He says he can't sleep since his wife passed away.     Phone Number Patient can be reached at: Cell number on file:    Telephone Information:   Mobile [phone]     Best Time: Anytime    Can we leave a detailed message on this number? YES    Call taken on 3/12/2020 at 10:27 AM by Gail Rodriguez

## 2020-03-12 NOTE — TELEPHONE ENCOUNTER
Scheduled patient for 10:50am on Friday     Left detailed message for patient       Candi Paiz

## 2020-03-13 ENCOUNTER — OFFICE VISIT (OUTPATIENT)
Dept: FAMILY MEDICINE | Facility: CLINIC | Age: 60
End: 2020-03-13
Payer: COMMERCIAL

## 2020-03-13 VITALS
BODY MASS INDEX: 26.22 KG/M2 | HEART RATE: 91 BPM | DIASTOLIC BLOOD PRESSURE: 94 MMHG | TEMPERATURE: 99.2 F | WEIGHT: 177 LBS | SYSTOLIC BLOOD PRESSURE: 156 MMHG | OXYGEN SATURATION: 96 % | HEIGHT: 69 IN

## 2020-03-13 DIAGNOSIS — Z51.81 MEDICATION MONITORING ENCOUNTER: ICD-10-CM

## 2020-03-13 DIAGNOSIS — Z12.11 SCREEN FOR COLON CANCER: ICD-10-CM

## 2020-03-13 DIAGNOSIS — N52.9 ERECTILE DYSFUNCTION, UNSPECIFIED ERECTILE DYSFUNCTION TYPE: ICD-10-CM

## 2020-03-13 DIAGNOSIS — F43.21 GRIEVING: Primary | ICD-10-CM

## 2020-03-13 DIAGNOSIS — R80.9 PROTEINURIA, UNSPECIFIED TYPE: ICD-10-CM

## 2020-03-13 DIAGNOSIS — Z12.5 SCREENING FOR PROSTATE CANCER: ICD-10-CM

## 2020-03-13 DIAGNOSIS — N18.2 CKD (CHRONIC KIDNEY DISEASE) STAGE 2, GFR 60-89 ML/MIN: ICD-10-CM

## 2020-03-13 DIAGNOSIS — I10 HYPERTENSION GOAL BP (BLOOD PRESSURE) < 140/90: ICD-10-CM

## 2020-03-13 DIAGNOSIS — R73.03 PREDIABETES: ICD-10-CM

## 2020-03-13 LAB
ALBUMIN SERPL-MCNC: 3.8 G/DL (ref 3.4–5)
ALBUMIN UR-MCNC: ABNORMAL MG/DL
ALP SERPL-CCNC: 65 U/L (ref 40–150)
ALT SERPL W P-5'-P-CCNC: 20 U/L (ref 0–70)
ANION GAP SERPL CALCULATED.3IONS-SCNC: 4 MMOL/L (ref 3–14)
APPEARANCE UR: CLEAR
AST SERPL W P-5'-P-CCNC: 10 U/L (ref 0–45)
BILIRUB SERPL-MCNC: 0.5 MG/DL (ref 0.2–1.3)
BILIRUB UR QL STRIP: NEGATIVE
BUN SERPL-MCNC: 16 MG/DL (ref 7–30)
CALCIUM SERPL-MCNC: 8.8 MG/DL (ref 8.5–10.1)
CHLORIDE SERPL-SCNC: 108 MMOL/L (ref 94–109)
CHOLEST SERPL-MCNC: 165 MG/DL
CK SERPL-CCNC: 70 U/L (ref 30–300)
CO2 SERPL-SCNC: 27 MMOL/L (ref 20–32)
COLOR UR AUTO: YELLOW
CREAT SERPL-MCNC: 0.87 MG/DL (ref 0.66–1.25)
ERYTHROCYTE [DISTWIDTH] IN BLOOD BY AUTOMATED COUNT: 12.6 % (ref 10–15)
GFR SERPL CREATININE-BSD FRML MDRD: >90 ML/MIN/{1.73_M2}
GLUCOSE SERPL-MCNC: 98 MG/DL (ref 70–99)
GLUCOSE UR STRIP-MCNC: NEGATIVE MG/DL
HBA1C MFR BLD: 5.6 % (ref 0–5.6)
HCT VFR BLD AUTO: 53 % (ref 40–53)
HDLC SERPL-MCNC: 50 MG/DL
HGB BLD-MCNC: 18 G/DL (ref 13.3–17.7)
HGB UR QL STRIP: ABNORMAL
KETONES UR STRIP-MCNC: NEGATIVE MG/DL
LDLC SERPL CALC-MCNC: 103 MG/DL
LEUKOCYTE ESTERASE UR QL STRIP: NEGATIVE
MCH RBC QN AUTO: 30.2 PG (ref 26.5–33)
MCHC RBC AUTO-ENTMCNC: 34 G/DL (ref 31.5–36.5)
MCV RBC AUTO: 89 FL (ref 78–100)
NITRATE UR QL: NEGATIVE
NONHDLC SERPL-MCNC: 115 MG/DL
PH UR STRIP: 6 PH (ref 5–7)
PLATELET # BLD AUTO: 263 10E9/L (ref 150–450)
POTASSIUM SERPL-SCNC: 4.3 MMOL/L (ref 3.4–5.3)
PROT SERPL-MCNC: 8.1 G/DL (ref 6.8–8.8)
PSA SERPL-ACNC: 4.65 UG/L (ref 0–4)
RBC # BLD AUTO: 5.96 10E12/L (ref 4.4–5.9)
RBC #/AREA URNS AUTO: NORMAL /HPF
SODIUM SERPL-SCNC: 139 MMOL/L (ref 133–144)
SOURCE: ABNORMAL
SP GR UR STRIP: >1.03 (ref 1–1.03)
TRIGL SERPL-MCNC: 59 MG/DL
TSH SERPL DL<=0.005 MIU/L-ACNC: 1.08 MU/L (ref 0.4–4)
UROBILINOGEN UR STRIP-ACNC: 0.2 EU/DL (ref 0.2–1)
WBC # BLD AUTO: 8.8 10E9/L (ref 4–11)
WBC #/AREA URNS AUTO: NORMAL /HPF

## 2020-03-13 PROCEDURE — 84443 ASSAY THYROID STIM HORMONE: CPT | Performed by: FAMILY MEDICINE

## 2020-03-13 PROCEDURE — 85027 COMPLETE CBC AUTOMATED: CPT | Performed by: FAMILY MEDICINE

## 2020-03-13 PROCEDURE — 80053 COMPREHEN METABOLIC PANEL: CPT | Performed by: FAMILY MEDICINE

## 2020-03-13 PROCEDURE — 82043 UR ALBUMIN QUANTITATIVE: CPT | Performed by: FAMILY MEDICINE

## 2020-03-13 PROCEDURE — 99214 OFFICE O/P EST MOD 30 MIN: CPT | Performed by: FAMILY MEDICINE

## 2020-03-13 PROCEDURE — 81001 URINALYSIS AUTO W/SCOPE: CPT | Performed by: FAMILY MEDICINE

## 2020-03-13 PROCEDURE — 82550 ASSAY OF CK (CPK): CPT | Performed by: FAMILY MEDICINE

## 2020-03-13 PROCEDURE — G0103 PSA SCREENING: HCPCS | Performed by: FAMILY MEDICINE

## 2020-03-13 PROCEDURE — 83036 HEMOGLOBIN GLYCOSYLATED A1C: CPT | Performed by: FAMILY MEDICINE

## 2020-03-13 PROCEDURE — 80061 LIPID PANEL: CPT | Performed by: FAMILY MEDICINE

## 2020-03-13 PROCEDURE — 36415 COLL VENOUS BLD VENIPUNCTURE: CPT | Performed by: FAMILY MEDICINE

## 2020-03-13 RX ORDER — SILDENAFIL CITRATE 20 MG/1
20-100 TABLET ORAL DAILY PRN
Qty: 20 TABLET | Refills: 0 | Status: SHIPPED | OUTPATIENT
Start: 2020-03-13 | End: 2020-03-30

## 2020-03-13 ASSESSMENT — ANXIETY QUESTIONNAIRES
5. BEING SO RESTLESS THAT IT IS HARD TO SIT STILL: NOT AT ALL
3. WORRYING TOO MUCH ABOUT DIFFERENT THINGS: NOT AT ALL
6. BECOMING EASILY ANNOYED OR IRRITABLE: NOT AT ALL
GAD7 TOTAL SCORE: 2
2. NOT BEING ABLE TO STOP OR CONTROL WORRYING: NOT AT ALL
IF YOU CHECKED OFF ANY PROBLEMS ON THIS QUESTIONNAIRE, HOW DIFFICULT HAVE THESE PROBLEMS MADE IT FOR YOU TO DO YOUR WORK, TAKE CARE OF THINGS AT HOME, OR GET ALONG WITH OTHER PEOPLE: NOT DIFFICULT AT ALL
7. FEELING AFRAID AS IF SOMETHING AWFUL MIGHT HAPPEN: NOT AT ALL
1. FEELING NERVOUS, ANXIOUS, OR ON EDGE: NOT AT ALL

## 2020-03-13 ASSESSMENT — MIFFLIN-ST. JEOR: SCORE: 1598.25

## 2020-03-13 ASSESSMENT — PATIENT HEALTH QUESTIONNAIRE - PHQ9
SUM OF ALL RESPONSES TO PHQ QUESTIONS 1-9: 9
5. POOR APPETITE OR OVEREATING: MORE THAN HALF THE DAYS

## 2020-03-13 NOTE — PROGRESS NOTES
[facility]  [place]    SUBJECTIVE    Yannick Jean is a 59 year old male who presents to clinic today for the following health issues:    Patient recently lost wife - cerebellar ataxia - having trouble sleeping - grieving - reliving decisions    Smoking and drinking again, was sober x 27 yrs - not ready yet to quit, will reconsider chantix, smoking 1 ppd, 10 drinks daily    PHQ = 9 and SADIE = 2    Type 2 DM/Prediabetes    Lab Results   Component Value Date    A1C 6.5 01/18/2019    A1C 6.1 08/17/2017    A1C 6.1 05/24/2017     Lipids    Recent Labs   Lab Test 01/18/19  1227   CHOL 179   HDL 40   *   TRIG 169*     Htn    BP Readings from Last 3 Encounters:   03/13/20 (!) 156/94   04/10/19 120/78   03/27/19 94/78     Creatinine   Date Value Ref Range Status   03/13/2020 0.87 0.66 - 1.25 mg/dL Final     Reviewed and updated as needed this visit by Provider    body mass index is 26.14 kg/m .    Wt Readings from Last 4 Encounters:   03/13/20 80.3 kg (177 lb)   03/22/19 98.4 kg (217 lb)   03/21/19 99.4 kg (219 lb 1.6 oz)   02/22/19 137.4 kg (303 lb)       Health Maintenance    Health Maintenance Due   Topic Date Due     DIABETIC FOOT EXAM  1960     HEPATITIS C SCREENING  1960     COLORECTAL CANCER SCREENING  06/24/2009     INFLUENZA VACCINE (1) 09/01/2019     PREVENTIVE CARE VISIT  01/18/2020       Current Problem List    Patient Active Problem List   Diagnosis     ED (erectile dysfunction)     Hyperplastic colonic polyp     Hypertension goal BP (blood pressure) < 140/90     Cervical pain     S/P cervical spinal fusion     Advanced directives, counseling/discussion     CKD (chronic kidney disease) stage 2, GFR 60-89 ml/min     Bell's palsy     Prediabetes     Hyperlipidemia LDL goal <100     Morbid obesity (H)     Diabetes mellitus, type 2 (H)       Past Medical History    Past Medical History:   Diagnosis Date     Bell's palsy 08/2017     Cervical stenosis of spine 5/15    central and  foraminal - moderate to severe     CKD (chronic kidney disease) stage 2, GFR 60-89 ml/min 05/2017     Colon polyps, hyperplastic 6/09    x 4 - due 2019     Diabetes mellitus, type 2 (H) 1/31/2019     ED (erectile dysfunction)     dr atkinson     Hyperlipidemia LDL goal <100 9/17/2018     Hypertension goal BP (blood pressure) < 140/90      Mixed hearing loss     Dr Pretty - [facility]     Prediabetes 05/2017     Tobacco use disorder     quit 1/09       Past Surgical History    Past Surgical History:   Procedure Laterality Date     COLONOSCOPY  6/09    hyperplastic - due 10 yrs     FUSION CERVICAL ANTERIOR THREE+ LEVELS N/A 7/30/2015    C3-6 FUSION CERVICAL ANTERIOR - Dr Morris     PE TUBES      PE tubes x 5     STRESS ECHO ([facility])  1/08    normal     SURGICAL HISTORY OF -   12/08    Rt Ear cholesteoma/tympanoplasty dr pretty     SURGICAL HISTORY OF -   7/09    dr patton - large lt foot ganglion cyst     TONSILLECTOMY & ADENOIDECTOMY  1963,1989       Current Medications    Current Outpatient Medications   Medication Sig Dispense Refill     sildenafil (REVATIO) 20 MG tablet Take 1-5 tablets ( mg) by mouth daily as needed (ED) 20 tablet 0       Allergies    Allergies   Allergen Reactions     Penicillins Unknown     Severe - ICU at age 1       Immunizations    Immunization History   Administered Date(s) Administered     Influenza (H1N1) 01/12/2010     Influenza (IIV3) PF 11/05/2008, 11/03/2009, 12/10/2010     Influenza Vaccine IM > 6 months Valent IIV4 11/01/2018     Pneumococcal 23 valent 09/17/2018     TD (ADULT, 7+) 01/01/2005     TDAP Vaccine (Boostrix) 07/29/2015     Zoster vaccine recombinant adjuvanted (SHINGRIX) 09/17/2018, 11/21/2018       Family History    Family History   Problem Relation Age of Onset     Cardiovascular Father         First MI at 61, dies of MI at age 65     Chronic Obstructive Pulmonary Disease Mother      Macular Degeneration Mother      Breast Cancer Maternal Grandmother      Coronary  Artery Disease Paternal Grandmother          at 50     Cerebrovascular Disease Paternal Grandfather        Social History    Social History     Socioeconomic History     Marital status:      Spouse name: aguila     Number of children: 2     Years of education: 12     Highest education level: Not on file   Occupational History     Employer: NONE    Social Needs     Financial resource strain: Not on file     Food insecurity     Worry: Not on file     Inability: Not on file     Transportation needs     Medical: Not on file     Non-medical: Not on file   Tobacco Use     Smoking status: Current Every Day Smoker     Packs/day: 2.00     Years: 32.00     Pack years: 64.00     Types: Cigarettes     Last attempt to quit: 2009     Years since quittin.2     Smokeless tobacco: Never Used   Substance and Sexual Activity     Alcohol use: Yes     Comment: 10 per week     Drug use: No     Sexual activity: Yes     Partners: Female   Lifestyle     Physical activity     Days per week: Not on file     Minutes per session: Not on file     Stress: Not on file   Relationships     Social connections     Talks on phone: Not on file     Gets together: Not on file     Attends Evangelical service: Not on file     Active member of club or organization: Not on file     Attends meetings of clubs or organizations: Not on file     Relationship status: Not on file     Intimate partner violence     Fear of current or ex partner: Not on file     Emotionally abused: Not on file     Physically abused: Not on file     Forced sexual activity: Not on file   Other Topics Concern      Service Not Asked     Blood Transfusions Not Asked     Caffeine Concern Yes     Comment: 1-2 cups qd, 5 cans daily     Occupational Exposure Not Asked     Hobby Hazards Not Asked     Sleep Concern Not Asked     Stress Concern Not Asked     Weight Concern Not Asked     Special Diet Not Asked     Back Care Not Asked     Exercise No     Bike Helmet Not  "Asked     Seat Belt No     Self-Exams Not Asked     Parent/sibling w/ CABG, MI or angioplasty before 65F 55M? Not Asked   Social History Narrative     Not on file       All above reviewed and updated, all stable unless otherwise noted    Recent labs reviewed    ROS    CONSTITUTIONAL: NEGATIVE for fever, chills, change in weight  INTEGUMENTARY/SKIN: NEGATIVE for worrisome rashes, moles or lesions  EYES: NEGATIVE for vision changes or irritation  ENT/MOUTH: NEGATIVE for ear, mouth and throat problems  RESP: NEGATIVE for significant cough or SOB  CV: NEGATIVE for chest pain, palpitations or peripheral edema  GI: NEGATIVE for nausea, abdominal pain, heartburn, or change in bowel habits  : NEGATIVE for frequency, dysuria, or hematuria  MUSCULOSKELETAL: NEGATIVE for significant arthralgias or myalgia  NEURO: NEGATIVE for weakness, dizziness or paresthesias  ENDOCRINE: NEGATIVE for temperature intolerance, skin/hair changes  HEME: NEGATIVE for bleeding problems  PSYCHIATRIC: NEGATIVE for changes in mood or affect    OBJECTIVE    BP (!) 156/94   Pulse 91   Temp 99.2  F (37.3  C) (Oral)   Ht 1.753 m (5' 9\")   Wt 80.3 kg (177 lb)   SpO2 96%   BMI 26.14 kg/m    Body mass index is 26.14 kg/m .  GENERAL: healthy, alert and no distress  EYES: Eyes grossly normal to inspection, extraocular movements - intact, and PERRL  HENT: ear canals- normal; TMs- normal; Nose- normal; Mouth- no ulcers, no lesions  NECK: no tenderness, no adenopathy, no asymmetry, no masses, no stiffness; thyroid- normal to palpation  RESP: lungs clear to auscultation - no rales, no rhonchi, no wheezes  CV: regular rates and rhythm, normal S1 S2, no S3 or S4 and no murmur, no click or rub -  ABDOMEN: soft, no tenderness, no  hepatosplenomegaly, no masses, normal bowel sounds  MS: extremities- no gross deformities noted, no edema  SKIN: no suspicious lesions, no rashes  NEURO: strength and tone- normal, sensory exam- grossly normal, mentation- intact, " speech- normal, reflexes- symmetric  BACK: no CVA tenderness, no paralumbar tenderness  PSYCH: Alert and oriented times 3; speech- coherent , normal rate and volume; able to articulate logical thoughts, able to abstract reason, no tangential thoughts, no hallucinations or delusions, affect- normal  LYMPHATICS: no cervical adenopathy    DIAGNOSTICS/PROCEDURE    Pending     ASSESSMENT      ICD-10-CM    1. Grieving  F43.21 TSH with free T4 reflex     Urine Microscopic   2. Prediabetes  R73.03 Comprehensive metabolic panel     UA reflex to Microscopic and Culture     Albumin Random Urine Quantitative with Creat Ratio     Hemoglobin A1c     Echocardiogram Exercise Stress   3. CKD (chronic kidney disease) stage 2, GFR 60-89 ml/min  N18.2 Echocardiogram Exercise Stress   4. Proteinuria, unspecified type  R80.9 Albumin Random Urine Quantitative with Creat Ratio   5. Hypertension goal BP (blood pressure) < 140/90  I10 Echocardiogram Exercise Stress   6. Erectile dysfunction, unspecified erectile dysfunction type  N52.9 Echocardiogram Exercise Stress     sildenafil (REVATIO) 20 MG tablet   7. Medication monitoring encounter  Z51.81 Comprehensive metabolic panel     Lipid panel reflex to direct LDL Fasting     CK total     CBC with platelets     TSH with free T4 reflex     UA reflex to Microscopic and Culture     Albumin Random Urine Quantitative with Creat Ratio     Hemoglobin A1c     Echocardiogram Exercise Stress   8. Screening for prostate cancer  Z12.5 Prostate spec antigen screen   9. Screen for colon cancer  Z12.11 Fecal colorectal cancer screen FIT       PLAN    Discussed treatment/modality options, including risk and benefits he desires:    1) consider mental health consult, consider medications    2) labs    3) stress echo    4) trial of sildenafil    5) advise tobacco/alcohol cessation, offered help, will consider    6) continue diet, exercise, with close follow up    All diagnosis above reviewed and noted above,  otherwise stable.  See [facility] orders for further details.     Return in about 1 month (around 4/13/2020), or if symptoms worsen or fail to improve, for Medication Recheck Visit, Follow Up Acute, Follow Up Chronic.    Health Maintenance Due   Topic Date Due     DIABETIC FOOT EXAM  1960     HEPATITIS C SCREENING  1960     COLORECTAL CANCER SCREENING  06/24/2009     INFLUENZA VACCINE (1) 09/01/2019     PREVENTIVE CARE VISIT  01/18/2020       See Patient Instructions             Maxwell Back MD, FAAFP     [facility]  [address] [place] [address] [id number] [place], [place] [email or web address]   Office: [phone]  Fax: [phone]  Pager: [phone]

## 2020-03-14 LAB
CREAT UR-MCNC: 198 MG/DL
MICROALBUMIN UR-MCNC: 94 MG/L
MICROALBUMIN/CREAT UR: 47.42 MG/G CR (ref 0–17)

## 2020-03-14 ASSESSMENT — ANXIETY QUESTIONNAIRES: GAD7 TOTAL SCORE: 2

## 2020-03-18 ENCOUNTER — TELEPHONE (OUTPATIENT)
Dept: CARDIOLOGY | Facility: CLINIC | Age: 60
End: 2020-03-18

## 2020-03-18 ENCOUNTER — TELEPHONE (OUTPATIENT)
Dept: FAMILY MEDICINE | Facility: CLINIC | Age: 60
End: 2020-03-18

## 2020-03-18 NOTE — TELEPHONE ENCOUNTER
APPOINTMENT RESCHEDULING NOTE    Procedure  Procedure to be rescheduled: Stress Echo  Ordering provider name: Maxwell Back  Ordering provider phone number:   Ordering provider confirmation to reschedule after April 1st 2020: Yes    Instructions for Patient  Patient informed to call the ordering provider if symptoms worsen or they have concerns.     Scheduling Phone Numbers  Cardiopulmonary Scheduling Phone Number: [phone]        Concerns of COVID  Patient informed to call [facility] (virtual visit) if they are concerned about COVID symptoms (fever, rash, cough, shortness of breath) or exposure.      [facility] instructions:  1. Go to the website [email or web address]  2. Create an account (you will need your insurance information)  3. Start a new visit  4. Choose your diagnosis (e.g. COVID19)  5. Fill out the information about your symptoms  6. A provider will reach out to you by text, phone call or video visit based on your request

## 2020-03-30 DIAGNOSIS — N52.9 ERECTILE DYSFUNCTION, UNSPECIFIED ERECTILE DYSFUNCTION TYPE: ICD-10-CM

## 2020-03-30 RX ORDER — SILDENAFIL CITRATE 20 MG/1
20-100 TABLET ORAL DAILY PRN
Qty: 20 TABLET | Refills: 0 | Status: SHIPPED | OUTPATIENT
Start: 2020-03-30 | End: 2020-04-10

## 2020-03-30 NOTE — TELEPHONE ENCOUNTER
"SILDENAFIL 20 MG TABLET   Last Written Prescription Date:  3/13/2020  Last Fill Quantity: 20,  # refills: 0   Last office visit: 3/13/2020 with prescribing provider:  Maxwell Back MD   Future Office Visit:  NA    Requested Prescriptions   Pending Prescriptions Disp Refills     sildenafil (REVATIO) 20 MG tablet [Pharmacy Med Name: SILDENAFIL 20 MG TABLET] 20 tablet 0     Sig: TAKE 1-5 TABLETS ( MG) BY MOUTH DAILY AS NEEDED (ED)       Erectile Dysfuction Protocol Passed - 3/30/2020  6:04 AM        Passed - Absence of nitrates on medication list        Passed - Absence of Alpha Blockers on Med list        Passed - Recent (12 mo) or future (30 days) visit within the authorizing provider's specialty     Patient has had an office visit with the authorizing provider or a provider within the authorizing providers department within the previous 12 mos or has a future within next 30 days. See \"Patient Info\" tab in inbasket, or \"Choose Columns\" in Meds & Orders section of the refill encounter.              Passed - Medication is active on med list        Passed - Patient is age 18 or older             "

## 2020-04-10 DIAGNOSIS — N52.9 ERECTILE DYSFUNCTION, UNSPECIFIED ERECTILE DYSFUNCTION TYPE: ICD-10-CM

## 2020-04-10 RX ORDER — SILDENAFIL CITRATE 20 MG/1
20-100 TABLET ORAL DAILY PRN
Qty: 20 TABLET | Refills: 0 | Status: SHIPPED | OUTPATIENT
Start: 2020-04-10 | End: 2020-04-14

## 2020-04-14 ENCOUNTER — VIRTUAL VISIT (OUTPATIENT)
Dept: FAMILY MEDICINE | Facility: CLINIC | Age: 60
End: 2020-04-14
Payer: COMMERCIAL

## 2020-04-14 VITALS — HEART RATE: 70 BPM | SYSTOLIC BLOOD PRESSURE: 137 MMHG | DIASTOLIC BLOOD PRESSURE: 82 MMHG

## 2020-04-14 DIAGNOSIS — Z12.5 SCREENING FOR PROSTATE CANCER: ICD-10-CM

## 2020-04-14 DIAGNOSIS — Z12.11 SCREEN FOR COLON CANCER: ICD-10-CM

## 2020-04-14 DIAGNOSIS — Z00.00 ENCOUNTER FOR ROUTINE ADULT HEALTH EXAMINATION WITHOUT ABNORMAL FINDINGS: ICD-10-CM

## 2020-04-14 DIAGNOSIS — Z51.81 MEDICATION MONITORING ENCOUNTER: ICD-10-CM

## 2020-04-14 DIAGNOSIS — R73.03 PREDIABETES: ICD-10-CM

## 2020-04-14 DIAGNOSIS — I10 HYPERTENSION GOAL BP (BLOOD PRESSURE) < 140/90: ICD-10-CM

## 2020-04-14 DIAGNOSIS — F43.21 GRIEVING: Primary | ICD-10-CM

## 2020-04-14 DIAGNOSIS — E11.9 TYPE 2 DIABETES MELLITUS WITHOUT COMPLICATION, WITHOUT LONG-TERM CURRENT USE OF INSULIN (H): ICD-10-CM

## 2020-04-14 DIAGNOSIS — F17.200 TOBACCO USE DISORDER: ICD-10-CM

## 2020-04-14 DIAGNOSIS — E78.5 HYPERLIPIDEMIA LDL GOAL <100: ICD-10-CM

## 2020-04-14 DIAGNOSIS — N52.9 ERECTILE DYSFUNCTION, UNSPECIFIED ERECTILE DYSFUNCTION TYPE: ICD-10-CM

## 2020-04-14 DIAGNOSIS — N18.2 CKD (CHRONIC KIDNEY DISEASE) STAGE 2, GFR 60-89 ML/MIN: ICD-10-CM

## 2020-04-14 PROBLEM — E66.01 MORBID OBESITY (H): Status: RESOLVED | Noted: 2018-09-23 | Resolved: 2020-04-14

## 2020-04-14 PROCEDURE — 99214 OFFICE O/P EST MOD 30 MIN: CPT | Mod: 95 | Performed by: FAMILY MEDICINE

## 2020-04-14 RX ORDER — SILDENAFIL CITRATE 20 MG/1
20-100 TABLET ORAL DAILY PRN
Qty: 50 TABLET | Refills: 3 | Status: SHIPPED | OUTPATIENT
Start: 2020-04-14 | End: 2020-07-16

## 2020-07-29 DIAGNOSIS — F17.200 TOBACCO USE DISORDER: Primary | ICD-10-CM

## 2020-07-29 NOTE — TELEPHONE ENCOUNTER
Medication Question or Refill  Who is calling:  PT   What medication are you calling about (include dose and sig)?: Champix - helps to stop smoking  Controlled Substance Agreement on file: No  Who prescribed the medication?:   Do you need a refill? Yes: - Pt Tried it and would like to continue   When did you use the medication last?   Patient offered an appointment? No  Do you have any questions or concerns?  No  Requested Pharmacy:   Okay to leave a detailed message?: Yes at Cell number on file:    Telephone Information:   Mobile [phone]

## 2020-07-30 NOTE — TELEPHONE ENCOUNTER
Routing refill request to provider for review/approval because:  Drug not active on patient's medication list  See telephone request below.  Sophie Mckinley RN

## 2020-09-08 DIAGNOSIS — F17.200 TOBACCO USE DISORDER: ICD-10-CM

## 2020-09-09 NOTE — TELEPHONE ENCOUNTER
"varenicline (CHANTIX PEREZ) 0.5 MG X 11 & 1 MG X 42 tablet  53 tablet  0  7/30/2020   --    Sig: Take 0.5 mg tab daily for 3 days, THEN 0.5 mg tab twice daily for 4 days, THEN 1 mg twice daily.    Sent to pharmacy as: Varenicline Tartrate 0.5 MG X 11 & 1 MG X 42 Oral (CHANTIX PEREZ)    Class: E-Prescribe        Last office visit: 3/13/2020 with prescribing provider:    Future Office Visit:          Requested Prescriptions   Pending Prescriptions Disp Refills     varenicline (CHANTIX STARTING MONTH PEREZ) 0.5 MG X 11 & 1 MG X 42 tablet [Pharmacy Med Name: CHANTIX STARTING MONTH BOX] 53 tablet 0     Sig: TAKE 0.5 MG TAB DAILY FOR 3 DAYS, THEN 0.5 MG TAB TWICE DAILY FOR 4 DAYS, THEN 1 MG TWICE DAILY.       Partial Cholinergic Nicotinic Agonist Agents Passed - 9/8/2020 10:03 AM        Passed - Blood pressure under 140/90 in past 12 months     BP Readings from Last 3 Encounters:   04/14/20 137/82   03/13/20 (!) 156/94   04/10/19 120/78                 Passed - Recent (12 mo) or future (30 days) visit within the authorizing provider's specialty     Patient has had an office visit with the authorizing provider or a provider within the authorizing providers department within the previous 12 mos or has a future within next 30 days. See \"Patient Info\" tab in inbasket, or \"Choose Columns\" in Meds & Orders section of the refill encounter.              Passed - Medication is active on med list        Passed - Patient is 18 years of age or older           Did pt start this ? Or is he needing a new starter pack?     Attempt # 1    Called #   Telephone Information:   Mobile [phone]         Left a non detailed VM     Rita Gilmore RN, BSN  [facility]         "

## 2020-09-11 RX ORDER — VARENICLINE TARTRATE 1 MG/1
1 TABLET, FILM COATED ORAL 2 TIMES DAILY
Qty: 180 TABLET | Refills: 1 | Status: SHIPPED | OUTPATIENT
Start: 2020-09-11 | End: 2021-11-15

## 2021-01-27 DIAGNOSIS — N52.9 ERECTILE DYSFUNCTION, UNSPECIFIED ERECTILE DYSFUNCTION TYPE: ICD-10-CM

## 2021-01-29 RX ORDER — SILDENAFIL CITRATE 20 MG/1
TABLET ORAL
Qty: 50 TABLET | Refills: 0 | Status: SHIPPED | OUTPATIENT
Start: 2021-01-29 | End: 2021-02-24

## 2021-02-23 DIAGNOSIS — N52.9 ERECTILE DYSFUNCTION, UNSPECIFIED ERECTILE DYSFUNCTION TYPE: ICD-10-CM

## 2021-02-24 RX ORDER — SILDENAFIL CITRATE 20 MG/1
TABLET ORAL
Qty: 50 TABLET | Refills: 0 | Status: SHIPPED | OUTPATIENT
Start: 2021-02-24 | End: 2021-04-20

## 2021-04-16 DIAGNOSIS — N52.9 ERECTILE DYSFUNCTION, UNSPECIFIED ERECTILE DYSFUNCTION TYPE: ICD-10-CM

## 2021-04-20 RX ORDER — SILDENAFIL CITRATE 20 MG/1
TABLET ORAL
Qty: 30 TABLET | Refills: 0 | Status: SHIPPED | OUTPATIENT
Start: 2021-04-20 | End: 2021-07-06

## 2021-04-20 NOTE — TELEPHONE ENCOUNTER
LOV: 4/14/2020   Patient due for physical  No future appt scheduled    Routing to [facility] to assist in scheduling    Raya Conklin RN  [facility]

## 2021-04-20 NOTE — TELEPHONE ENCOUNTER
[place] is [place] of state and will be back within 3 weeks. He will call back when he is back in [place]. He needs med refilled before he can be in for PX

## 2021-07-05 DIAGNOSIS — N52.9 ERECTILE DYSFUNCTION, UNSPECIFIED ERECTILE DYSFUNCTION TYPE: ICD-10-CM

## 2021-07-06 RX ORDER — SILDENAFIL CITRATE 20 MG/1
TABLET ORAL
Qty: 30 TABLET | Refills: 0 | Status: SHIPPED | OUTPATIENT
Start: 2021-07-06 | End: 2021-10-15

## 2021-07-06 NOTE — TELEPHONE ENCOUNTER
Medication is being filled for 1 time refill only due to:  Patient needs to be seen because it has been more than one year since last visit.    Routing to MA/TC pool. The Pt is due for a visit with PCP  Nataliia Spence RN, BSN

## 2021-10-12 DIAGNOSIS — N52.9 ERECTILE DYSFUNCTION, UNSPECIFIED ERECTILE DYSFUNCTION TYPE: ICD-10-CM

## 2021-10-15 RX ORDER — SILDENAFIL CITRATE 20 MG/1
TABLET ORAL
Qty: 15 TABLET | Refills: 0 | Status: SHIPPED | OUTPATIENT
Start: 2021-10-15 | End: 2021-11-15

## 2021-10-15 NOTE — TELEPHONE ENCOUNTER
Routing refill request to provider for review/approval because:  Yue given x1 and patient did not follow up, please advise    LOV 4/2020    Mari Schultz RN

## 2021-11-04 ENCOUNTER — DOCUMENTATION ONLY (OUTPATIENT)
Dept: LAB | Facility: CLINIC | Age: 61
End: 2021-11-04

## 2021-11-04 DIAGNOSIS — Z12.5 SCREENING FOR PROSTATE CANCER: ICD-10-CM

## 2021-11-04 DIAGNOSIS — Z12.11 SCREEN FOR COLON CANCER: ICD-10-CM

## 2021-11-04 DIAGNOSIS — E11.9 TYPE 2 DIABETES MELLITUS WITHOUT COMPLICATION, WITHOUT LONG-TERM CURRENT USE OF INSULIN (H): ICD-10-CM

## 2021-11-04 DIAGNOSIS — I10 HYPERTENSION GOAL BP (BLOOD PRESSURE) < 140/90: ICD-10-CM

## 2021-11-04 DIAGNOSIS — E78.5 HYPERLIPIDEMIA LDL GOAL <100: ICD-10-CM

## 2021-11-04 DIAGNOSIS — N18.2 CKD (CHRONIC KIDNEY DISEASE) STAGE 2, GFR 60-89 ML/MIN: ICD-10-CM

## 2021-11-04 DIAGNOSIS — Z00.00 ROUTINE GENERAL MEDICAL EXAMINATION AT A HEALTH CARE FACILITY: Primary | ICD-10-CM

## 2021-11-04 NOTE — PROGRESS NOTES
Med rec complete per pt  Allergies reviewed   Yannick HARSHAL Jean has an upcoming lab appointment:    Future Appointments   Date Time Provider Department Center   11/15/2021 12:00 PM RV LAB [facility]   11/15/2021  1:30 PM Maxwell Back MD [facility] RV     Patient is scheduled for the following lab(s) PT COMING IN FASTING ON 11/15/21    There is no order available. Please review and place either future orders or HMPO (Review of Health Maintenance Protocol Orders), as appropriate.    Health Maintenance Due   Topic     ANNUAL REVIEW OF HM ORDERS      HEPATITIS C SCREENING      A1C      BMP      LIPID      MICROALBUMIN      PSA      Brittnee Torres

## 2021-11-10 NOTE — PROGRESS NOTES
Labs ordered    BP Readings from Last 3 Encounters:   04/14/20 137/82   03/13/20 (!) 156/94   04/10/19 120/78     Lab Results   Component Value Date    A1C 5.6 03/13/2020    A1C 6.5 01/18/2019    A1C 6.1 08/17/2017    A1C 6.1 05/24/2017

## 2021-11-15 ENCOUNTER — LAB (OUTPATIENT)
Dept: LAB | Facility: CLINIC | Age: 61
End: 2021-11-15

## 2021-11-15 ENCOUNTER — OFFICE VISIT (OUTPATIENT)
Dept: FAMILY MEDICINE | Facility: CLINIC | Age: 61
End: 2021-11-15

## 2021-11-15 VITALS
HEIGHT: 69 IN | OXYGEN SATURATION: 97 % | TEMPERATURE: 98.6 F | HEART RATE: 79 BPM | SYSTOLIC BLOOD PRESSURE: 136 MMHG | RESPIRATION RATE: 20 BRPM | BODY MASS INDEX: 25.87 KG/M2 | DIASTOLIC BLOOD PRESSURE: 80 MMHG | WEIGHT: 174.7 LBS

## 2021-11-15 DIAGNOSIS — E78.5 HYPERLIPIDEMIA LDL GOAL <100: ICD-10-CM

## 2021-11-15 DIAGNOSIS — E11.22 TYPE 2 DIABETES MELLITUS WITH STAGE 2 CHRONIC KIDNEY DISEASE, WITHOUT LONG-TERM CURRENT USE OF INSULIN (H): ICD-10-CM

## 2021-11-15 DIAGNOSIS — Z00.00 ROUTINE GENERAL MEDICAL EXAMINATION AT A HEALTH CARE FACILITY: Primary | ICD-10-CM

## 2021-11-15 DIAGNOSIS — I10 HYPERTENSION GOAL BP (BLOOD PRESSURE) < 140/90: ICD-10-CM

## 2021-11-15 DIAGNOSIS — N18.2 TYPE 2 DIABETES MELLITUS WITH STAGE 2 CHRONIC KIDNEY DISEASE, WITHOUT LONG-TERM CURRENT USE OF INSULIN (H): ICD-10-CM

## 2021-11-15 DIAGNOSIS — Z12.5 SCREENING FOR PROSTATE CANCER: ICD-10-CM

## 2021-11-15 DIAGNOSIS — Z23 NEED FOR INFLUENZA VACCINATION: ICD-10-CM

## 2021-11-15 DIAGNOSIS — Z51.81 MEDICATION MONITORING ENCOUNTER: ICD-10-CM

## 2021-11-15 DIAGNOSIS — Z00.00 ROUTINE GENERAL MEDICAL EXAMINATION AT A HEALTH CARE FACILITY: ICD-10-CM

## 2021-11-15 DIAGNOSIS — E11.9 TYPE 2 DIABETES MELLITUS WITHOUT COMPLICATION, WITHOUT LONG-TERM CURRENT USE OF INSULIN (H): ICD-10-CM

## 2021-11-15 DIAGNOSIS — N18.2 CKD (CHRONIC KIDNEY DISEASE) STAGE 2, GFR 60-89 ML/MIN: ICD-10-CM

## 2021-11-15 DIAGNOSIS — Z12.11 SCREEN FOR COLON CANCER: ICD-10-CM

## 2021-11-15 DIAGNOSIS — N52.9 ERECTILE DYSFUNCTION, UNSPECIFIED ERECTILE DYSFUNCTION TYPE: ICD-10-CM

## 2021-11-15 PROBLEM — R73.03 PREDIABETES: Status: RESOLVED | Noted: 2018-09-17 | Resolved: 2021-11-15

## 2021-11-15 LAB
ALBUMIN UR-MCNC: 30 MG/DL
APPEARANCE UR: CLEAR
BILIRUB UR QL STRIP: NEGATIVE
COLOR UR AUTO: YELLOW
ERYTHROCYTE [DISTWIDTH] IN BLOOD BY AUTOMATED COUNT: 12.2 % (ref 10–15)
GLUCOSE UR STRIP-MCNC: NEGATIVE MG/DL
HBA1C MFR BLD: 5.6 % (ref 0–5.6)
HCT VFR BLD AUTO: 52 % (ref 40–53)
HGB BLD-MCNC: 17.6 G/DL (ref 13.3–17.7)
HGB UR QL STRIP: NEGATIVE
KETONES UR STRIP-MCNC: NEGATIVE MG/DL
LEUKOCYTE ESTERASE UR QL STRIP: ABNORMAL
MCH RBC QN AUTO: 31.8 PG (ref 26.5–33)
MCHC RBC AUTO-ENTMCNC: 33.8 G/DL (ref 31.5–36.5)
MCV RBC AUTO: 94 FL (ref 78–100)
MUCOUS THREADS #/AREA URNS LPF: PRESENT /LPF
NITRATE UR QL: NEGATIVE
PH UR STRIP: 6.5 [PH] (ref 5–7)
PLATELET # BLD AUTO: 249 10E3/UL (ref 150–450)
RBC # BLD AUTO: 5.54 10E6/UL (ref 4.4–5.9)
RBC #/AREA URNS AUTO: ABNORMAL /HPF
SP GR UR STRIP: >=1.03 (ref 1–1.03)
SQUAMOUS #/AREA URNS AUTO: ABNORMAL /LPF
UROBILINOGEN UR STRIP-ACNC: 0.2 E.U./DL
WBC # BLD AUTO: 7.8 10E3/UL (ref 4–11)
WBC #/AREA URNS AUTO: ABNORMAL /HPF

## 2021-11-15 PROCEDURE — 81001 URINALYSIS AUTO W/SCOPE: CPT

## 2021-11-15 PROCEDURE — 80053 COMPREHEN METABOLIC PANEL: CPT

## 2021-11-15 PROCEDURE — 36415 COLL VENOUS BLD VENIPUNCTURE: CPT

## 2021-11-15 PROCEDURE — G0103 PSA SCREENING: HCPCS

## 2021-11-15 PROCEDURE — 84443 ASSAY THYROID STIM HORMONE: CPT

## 2021-11-15 PROCEDURE — 99396 PREV VISIT EST AGE 40-64: CPT | Mod: 25 | Performed by: FAMILY MEDICINE

## 2021-11-15 PROCEDURE — 90471 IMMUNIZATION ADMIN: CPT | Performed by: FAMILY MEDICINE

## 2021-11-15 PROCEDURE — 82043 UR ALBUMIN QUANTITATIVE: CPT

## 2021-11-15 PROCEDURE — 85027 COMPLETE CBC AUTOMATED: CPT

## 2021-11-15 PROCEDURE — 82550 ASSAY OF CK (CPK): CPT

## 2021-11-15 PROCEDURE — 80061 LIPID PANEL: CPT

## 2021-11-15 PROCEDURE — 99207 PR FOOT EXAM NO CHARGE: CPT | Mod: 25 | Performed by: FAMILY MEDICINE

## 2021-11-15 PROCEDURE — 83036 HEMOGLOBIN GLYCOSYLATED A1C: CPT

## 2021-11-15 PROCEDURE — 90682 RIV4 VACC RECOMBINANT DNA IM: CPT | Performed by: FAMILY MEDICINE

## 2021-11-15 RX ORDER — SILDENAFIL CITRATE 20 MG/1
TABLET ORAL
Qty: 60 TABLET | Refills: 3 | Status: SHIPPED | OUTPATIENT
Start: 2021-11-15 | End: 2022-07-22

## 2021-11-15 ASSESSMENT — ENCOUNTER SYMPTOMS
CHILLS: 0
EYE PAIN: 0
NAUSEA: 0
NERVOUS/ANXIOUS: 0
JOINT SWELLING: 0
DYSURIA: 0
HEMATOCHEZIA: 0
ARTHRALGIAS: 1
HEMATURIA: 0
ABDOMINAL PAIN: 0
DIZZINESS: 0
PALPITATIONS: 0
DIARRHEA: 0
SHORTNESS OF BREATH: 0
HEARTBURN: 0
COUGH: 1
HEADACHES: 0
CONSTIPATION: 0
WEAKNESS: 0
PARESTHESIAS: 0
MYALGIAS: 0
FEVER: 0
SORE THROAT: 0
FREQUENCY: 0

## 2021-11-15 ASSESSMENT — MIFFLIN-ST. JEOR: SCORE: 1577.81

## 2021-11-15 NOTE — PROGRESS NOTES
[facility]  [place]    SUBJECTIVE    CC: Yannick Jean is an 61 year old male who presents for preventative health visit.   Patient has been advised of split billing requirements and indicates understanding: Yes     Healthy Habits:     Getting at least 3 servings of Calcium per day:  Yes    Bi-annual eye exam:  Yes    Dental care twice a year:  NO    Sleep apnea or symptoms of sleep apnea:  None    Diet:  Regular (no restrictions)    Frequency of exercise:  None    Taking medications regularly:  Yes    Medication side effects:  None    PHQ-2 Total Score: 0    Additional concerns today:  No    Diabetes Follow-up    How often are you checking your blood sugar? Not at all    What concerns do you have today about your diabetes? None     Do you have any of these symptoms? (Select all that apply)  No numbness or tingling in feet.  No redness, sores or blisters on feet.  No complaints of excessive thirst.  No reports of blurry vision.  No significant changes to weight.    Have you had a diabetic eye exam in the last 12 months? No     Glucose   Date Value Ref Range Status   11/15/2021 75 70 - 99 mg/dL Final   03/13/2020 98 70 - 99 mg/dL Final     Comment:     Fasting specimen   01/18/2019 98 70 - 99 mg/dL Final     Comment:     Fasting specimen   08/16/2017 179 (H) 70 - 99 mg/dL Final     Comment:     Results confirmed by repeat test   05/24/2017 85 70 - 99 mg/dL Final   05/17/2017 144 (H) 70 - 99 mg/dL Final     Lab Results   Component Value Date    A1C 5.6 11/15/2021    A1C 5.6 03/13/2020    A1C 6.5 01/18/2019    A1C 6.1 08/17/2017    A1C 6.1 05/24/2017             Hyperlipidemia Follow-Up    Are you regularly taking any medication or supplement to lower your cholesterol?   No    Are you having muscle aches or other side effects that you think could be caused by your cholesterol lowering medication?  No     Recent Labs   Lab Test 03/13/20  1208 01/18/19  1227   CHOL 165 179   HDL 50 40   * 105*    TRIG 59 169*     CKD 2/Hypertension Follow-up    Do you check your blood pressure regularly outside of the clinic? No     Are you following a low salt diet? No    Are your blood pressures ever more than 140 on the top number (systolic) OR more   than 90 on the bottom number (diastolic), for example 140/90? No    BP Readings from Last 3 Encounters:   11/15/21 136/80   20 137/82   20 (!) 156/94     Creatinine   Date Value Ref Range Status   11/15/2021 0.79 0.66 - 1.25 mg/dL Final   2020 0.87 0.66 - 1.25 mg/dL Final     GFR Estimate   Date Value Ref Range Status   11/15/2021 >90 >60 mL/min/1.73m2 Final     Comment:     As of 2021, eGFR is calculated by the CKD-EPI creatinine equation, without race adjustment. eGFR can be influenced by muscle mass, exercise, and diet. The reported eGFR is an estimation only and is only applicable if the renal function is stable.   2020 >90 >60 mL/min/[1.73_m2] Final     Comment:     Non  GFR Calc  Starting 2018, serum creatinine based estimated GFR (eGFR) will be   calculated using the Chronic Kidney Disease Epidemiology Collaboration   (CKD-EPI) equation.       Today's PHQ-2 Score:   PHQ-2 ( 1999 Pfizer) 11/15/2021   Q1: Little interest or pleasure in doing things 0   Q2: Feeling down, depressed or hopeless 0   PHQ-2 Score 0   Q1: Little interest or pleasure in doing things Not at all   Q2: Feeling down, depressed or hopeless Not at all   PHQ-2 Score 0     Abuse: Current or Past(Physical, Sexual or Emotional)- No  Do you feel safe in your environment? Yes    Social History     Tobacco Use     Smoking status: Current Every Day Smoker     Packs/day: 2.00     Years: 32.00     Pack years: 64.00     Types: Cigarettes     Last attempt to quit: 2009     Years since quittin.8     Smokeless tobacco: Never Used   Substance Use Topics     Alcohol use: Yes     Alcohol/week: 10.0 standard drinks     Types: 10 Standard drinks or  equivalent per week     Comment: 10 per week       Alcohol Use 11/15/2021   Prescreen: >3 drinks/day or >7 drinks/week? No       Last PSA:   PSA   Date Value Ref Range Status   03/13/2020 4.65 (H) 0 - 4 ug/L Final     Comment:     Assay Method:  Chemiluminescence using Siemens Vista analyzer     Prostate Specific Antigen Screen   Date Value Ref Range Status   11/15/2021 2.68 0.00 - 4.00 ug/L Final       Reviewed orders with patient. Reviewed health maintenance and updated orders accordingly - Yes    Health Maintenance     Colonoscopy:  consider   FIT:  consider              PSA:  pending   DEXA:  NA              CT Chest: declines    Health Maintenance Due   Topic Date Due     HEPATITIS C SCREENING  Never done     LUNG CANCER SCREENING  Never done     COLORECTAL CANCER SCREENING  06/23/2019     EYE EXAM  12/15/2020     COVID-19 Vaccine (2 - Booster for Que series) 09/23/2021       Current Problem List    Patient Active Problem List   Diagnosis     ED (erectile dysfunction)     Hyperplastic colonic polyp     Hypertension goal BP (blood pressure) < 140/90     Cervical pain     S/P cervical spinal fusion     Advanced directives, counseling/discussion     CKD (chronic kidney disease) stage 2, GFR 60-89 ml/min     Bell's palsy     Hyperlipidemia LDL goal <100     Diabetes mellitus, type 2 (H)       Past Medical History    Past Medical History:   Diagnosis Date     Bell's palsy 08/2017     Cervical stenosis of spine 5/15    central and foraminal - moderate to severe     CKD (chronic kidney disease) stage 2, GFR 60-89 ml/min 05/2017     Colon polyps, hyperplastic 6/09    x 4 - due 2019     Diabetes mellitus, type 2 (H) 1/31/2019     ED (erectile dysfunction)     dr atkinson     Hyperlipidemia LDL goal <100 9/17/2018     Hypertension goal BP (blood pressure) < 140/90      Mixed hearing loss     Dr Judi SPRAGUE     Prediabetes 05/2017     Tobacco use disorder     quit 1/09       Past Surgical History    Past Surgical  History:   Procedure Laterality Date     COLONOSCOPY      hyperplastic - due 10 yrs     FUSION CERVICAL ANTERIOR THREE+ LEVELS N/A 2015    C3-6 FUSION CERVICAL ANTERIOR - Dr Morris     PE TUBES      PE tubes x 5     STRESS ECHO (METRO)      normal     SURGICAL HISTORY OF -       Rt Ear cholesteoma/tympanoplasty dr pretty     SURGICAL HISTORY OF -       dr patton - large lt foot ganglion cyst     TONSILLECTOMY & ADENOIDECTOMY  ,       Current Medications    Current Outpatient Medications   Medication Sig Dispense Refill     ACE/ARB/ARNI NOT PRESCRIBED (INTENTIONAL) Please choose reason not prescribed from choices below.       sildenafil (REVATIO) 20 MG tablet TAKE 3-5 TABLETS 30-60 MINUTES BEFORE INTERCOURSE, NO USE WITH NITROGLYCERIN OR ALPHA BLOCKERS 60 tablet 3     STATIN NOT PRESCRIBED (INTENTIONAL) Please choose reason not prescribed from choices below.         Allergies    Allergies   Allergen Reactions     Penicillins Unknown     Severe - ICU at age 1       Immunizations    Immunization History   Administered Date(s) Administered     COVID-19,PF,Que 2021     Influenza (H1N1) 2010     Influenza (IIV3) PF 10/23/2006, 2008, 2009, 12/10/2010, 10/19/2012     Influenza Quad, Recombinant, pf(RIV4) (Flublok) 11/15/2021     Influenza Vaccine IM > 6 months Valent IIV4 (Alfuria,Fluzone) 2018     Pneumococcal 23 valent 2018     TD (ADULT, 7+) 2005     TDAP Vaccine (Boostrix) 2015     Zoster vaccine recombinant adjuvanted (SHINGRIX) 2018, 2018       Family History    Family History   Problem Relation Age of Onset     Cardiovascular Father         First MI at 61, dies of MI at age 65     Chronic Obstructive Pulmonary Disease Mother      Macular Degeneration Mother      Breast Cancer Maternal Grandmother      Coronary Artery Disease Paternal Grandmother          at 50     Cerebrovascular Disease Paternal Grandfather         Social History    Social History     Socioeconomic History     Marital status:      Spouse name: Nishant - Significant other     Number of children: 2     Years of education: 12     Highest education level: Not on file   Occupational History     Employer: NONE    Tobacco Use     Smoking status: Current Every Day Smoker     Packs/day: 2.00     Years: 32.00     Pack years: 64.00     Types: Cigarettes     Last attempt to quit: 2009     Years since quittin.8     Smokeless tobacco: Never Used   Vaping Use     Vaping Use: Never used   Substance and Sexual Activity     Alcohol use: Yes     Alcohol/week: 10.0 standard drinks     Types: 10 Standard drinks or equivalent per week     Comment: 10 per week     Drug use: No     Sexual activity: Yes     Partners: Female   Other Topics Concern      Service Not Asked     Blood Transfusions Not Asked     Caffeine Concern Yes     Comment: 1-2 cups qd, 5 cans daily     Occupational Exposure Not Asked     Hobby Hazards Not Asked     Sleep Concern Not Asked     Stress Concern Not Asked     Weight Concern Not Asked     Special Diet Not Asked     Back Care Not Asked     Exercise No     Bike Helmet Not Asked     Seat Belt No     Self-Exams Not Asked     Parent/sibling w/ CABG, MI or angioplasty before 65F 55M? Not Asked   Social History Narrative     Not on file     Social Determinants of Health     Financial Resource Strain: Not on file   Food Insecurity: Not on file   Transportation Needs: Not on file   Physical Activity: Not on file   Stress: Not on file   Social Connections: Not on file   Intimate Partner Violence: Not on file   Housing Stability: Not on file       ROS    CONSTITUTIONAL: NEGATIVE for fever, chills, change in weight  INTEGUMENTARY/SKIN: NEGATIVE for worrisome rashes, moles or lesions  EYES: NEGATIVE for vision changes or irritation  ENT/MOUTH: NEGATIVE for ear, mouth and throat problems  RESP: NEGATIVE for significant cough or SOB  CV:  "NEGATIVE for chest pain, palpitations or peripheral edema  GI: NEGATIVE for nausea, abdominal pain, heartburn, or change in bowel habits  : NEGATIVE for frequency, dysuria, or hematuria  MUSCULOSKELETAL: NEGATIVE for significant arthralgias or myalgia  NEURO: NEGATIVE for weakness, dizziness or paresthesias  ENDOCRINE: NEGATIVE for temperature intolerance, skin/hair changes  HEME: NEGATIVE for bleeding problems  PSYCHIATRIC: NEGATIVE for changes in mood or affect    OBJECTIVE    /80   Pulse 79   Temp 98.6  F (37  C) (Tympanic)   Resp 20   Ht 1.753 m (5' 9\")   Wt 79.2 kg (174 lb 11.2 oz)   SpO2 97%   BMI 25.80 kg/m      EXAM:    GENERAL: healthy, alert and no distress  EYES: Eyes grossly normal to inspection, PERRL and conjunctivae and sclerae normal  HENT: ear canals and TM's normal, nose and mouth without ulcers or lesions  NECK: no adenopathy, no asymmetry, masses, or scars and thyroid normal to palpation  RESP: lungs clear to auscultation - no rales, rhonchi or wheezes  CV: regular rate and rhythm, normal S1 S2, no S3 or S4, no murmur, click or rub, no peripheral edema and peripheral pulses strong  ABDOMEN: soft, nontender, no hepatosplenomegaly, no masses and bowel sounds normal   (male): Pt declines  RECTAL: Pt declines  MS: no gross musculoskeletal defects noted, no edema  SKIN: no suspicious lesions or rashes  NEURO: Normal strength and tone, mentation intact and speech normal  PSYCH: mentation appears normal, affect normal/bright  LYMPH: no cervical, supraclavicular, axillary, or inguinal adenopathy  Diabetic foot exam: normal DP and PT pulses, no trophic changes or ulcerative lesions and normal sensory exam    DIAGNOSTICS/PROCEDURES    Pending    ASSESSMENT      ICD-10-CM    1. Routine general medical examination at a health care facility  Z00.00    2. Type 2 diabetes mellitus with stage 2 chronic kidney disease, without long-term current use of insulin (H)  E11.22 STATIN NOT PRESCRIBED " (INTENTIONAL)    N18.2 ACE/ARB/ARNI NOT PRESCRIBED (INTENTIONAL)     FOOT EXAM   3. CKD (chronic kidney disease) stage 2, GFR 60-89 ml/min  N18.2    4. Hypertension goal BP (blood pressure) < 140/90  I10    5. Hyperlipidemia LDL goal <100  E78.5    6. Erectile dysfunction, unspecified erectile dysfunction type  N52.9 sildenafil (REVATIO) 20 MG tablet   7. Screening for prostate cancer  Z12.5 Adult Gastro Ref - Procedure Only   8. Screen for colon cancer  Z12.11    9. Medication monitoring encounter  Z51.81    10. Need for influenza vaccination  Z23 INFLUENZA QUAD, RECOMBINANT, P-FREE (RIV4) (FLUBLOK)       PLAN    Discussed treatment/modality options, including risk and benefits, he desires:    advised alcohol consumption 1oz per day or less, advised aspirin 81 mg po daily, advised 1 multivitamin per day, advised calcium 7901-8995 mg/d and Vitamin D 800-1200 IU/d, advised dentist every 6 months, advised diet and exercise, advised opthalmologist every 1 years, advised self testicular exam q month, further health care maintenance, further lab(s), immunization(s), medication refill(s) and observation    Discussed controversies surrounding PSA. Specifically reviewed 2017 USPSTF findings recommending discussion of PSA testing for men ages 55-69.  Reviewed findings of the  Randomized Study of Screening for Prostate Cancer which showed a 30% reduction in advanced stage prostate cancer and a 20% reduction in death rate from prostate cancer in this age group. PSA-based screening may prevent up to 2 deaths and up to 3 cases of metastatic disease per 1,000 men screened over 13 years.    We've elected to do PSA this year after discussing these controversies.    All diagnosis above reviewed and noted above, otherwise stable.      See [facility] orders for further details.      1) med refills    2) labs pending    3) colonoscopy    4) declines CT Chest    5) flu vaccine today    6) COVID booster after 1/29/2022    Return in  "about 6 months (around 5/15/2022).    Health Maintenance Due   Topic Date Due     HEPATITIS C SCREENING  Never done     LUNG CANCER SCREENING  Never done     COLORECTAL CANCER SCREENING  06/23/2019     EYE EXAM  12/15/2020     COVID-19 Vaccine (2 - Booster for Que series) 09/23/2021       COUNSELING    Reviewed preventive health counseling, as reflected in patient instructions    BP Readings from Last 1 Encounters:   11/15/21 136/80     Estimated body mass index is 25.8 kg/m  as calculated from the following:    Height as of this encounter: 1.753 m (5' 9\").    Weight as of this encounter: 79.2 kg (174 lb 11.2 oz).           reports that he has been smoking cigarettes. He has a 64.00 pack-year smoking history. He has never used smokeless tobacco.  Tobacco Cessation Action Plan: Information offered: Patient not interested at this time    Counseling Resources:    ATP IV Guidelines  Pooled Cohorts Equation Calculator  FRAX Risk Assessment  ICSI Preventive Guidelines  Dietary Guidelines for Americans, 2010  [employer]'s MyPlate  ASA Prophylaxis  Lung CA Screening           Maxwell Back MD, FAAFP     [facility]  [address] [place] [address]  [email or web address]   Office: [phone]  Fax: [phone]  Pager: [phone]     Answers for HPI/ROS submitted by the patient on 11/15/2021  abdominal pain: No  Blood in stool: No  Blood in urine: No  chest pain: No  chills: No  congestion: No  constipation: No  cough: Yes  diarrhea: No  dizziness: No  ear pain: No  eye pain: No  nervous/anxious: No  fever: No  frequency: No  genital sores: No  headaches: No  hearing loss: Yes  heartburn: No  arthralgias: Yes  joint swelling: No  peripheral edema: No  mood changes: No  myalgias: No  nausea: No  dysuria: No  palpitations: No  Skin sensation changes: No  sore throat: No  urgency: No  rash: No  shortness of breath: No  visual disturbance: " No  weakness: No  impotence: Yes  penile discharge: No

## 2021-11-16 LAB
ALBUMIN SERPL-MCNC: 3.8 G/DL (ref 3.4–5)
ALP SERPL-CCNC: 54 U/L (ref 40–150)
ALT SERPL W P-5'-P-CCNC: 28 U/L (ref 0–70)
ANION GAP SERPL CALCULATED.3IONS-SCNC: 4 MMOL/L (ref 3–14)
AST SERPL W P-5'-P-CCNC: 20 U/L (ref 0–45)
BILIRUB SERPL-MCNC: 0.5 MG/DL (ref 0.2–1.3)
BUN SERPL-MCNC: 15 MG/DL (ref 7–30)
CALCIUM SERPL-MCNC: 8.9 MG/DL (ref 8.5–10.1)
CHLORIDE BLD-SCNC: 111 MMOL/L (ref 94–109)
CHOLEST SERPL-MCNC: 186 MG/DL
CK SERPL-CCNC: 73 U/L (ref 30–300)
CO2 SERPL-SCNC: 26 MMOL/L (ref 20–32)
CREAT SERPL-MCNC: 0.79 MG/DL (ref 0.66–1.25)
CREAT UR-MCNC: 140 MG/DL
FASTING STATUS PATIENT QL REPORTED: YES
GFR SERPL CREATININE-BSD FRML MDRD: >90 ML/MIN/1.73M2
GLUCOSE BLD-MCNC: 75 MG/DL (ref 70–99)
HDLC SERPL-MCNC: 78 MG/DL
LDLC SERPL CALC-MCNC: 99 MG/DL
MICROALBUMIN UR-MCNC: 150 MG/L
MICROALBUMIN/CREAT UR: 107.14 MG/G CR (ref 0–17)
NONHDLC SERPL-MCNC: 108 MG/DL
POTASSIUM BLD-SCNC: 4.7 MMOL/L (ref 3.4–5.3)
PROT SERPL-MCNC: 7.7 G/DL (ref 6.8–8.8)
PSA SERPL-MCNC: 2.68 UG/L (ref 0–4)
SODIUM SERPL-SCNC: 141 MMOL/L (ref 133–144)
TRIGL SERPL-MCNC: 46 MG/DL
TSH SERPL DL<=0.005 MIU/L-ACNC: 1.27 MU/L (ref 0.4–4)

## 2021-11-22 DIAGNOSIS — R31.29 MICROSCOPIC HEMATURIA: Primary | ICD-10-CM

## 2021-11-22 DIAGNOSIS — R80.9 PROTEINURIA: ICD-10-CM

## 2022-01-14 NOTE — TELEPHONE ENCOUNTER
Patient wife calling- patient is out of medication. -Kelli Talavera, Certified Pharmacy Technician, [facility], [facility], Ph. [phone]     Psychiatry

## 2022-11-17 DIAGNOSIS — N52.9 ERECTILE DYSFUNCTION, UNSPECIFIED ERECTILE DYSFUNCTION TYPE: ICD-10-CM

## 2022-11-21 RX ORDER — SILDENAFIL CITRATE 20 MG/1
TABLET ORAL
Qty: 60 TABLET | Refills: 1 | OUTPATIENT
Start: 2022-11-21

## 2022-11-22 RX ORDER — SILDENAFIL CITRATE 20 MG/1
TABLET ORAL
Qty: 60 TABLET | Refills: 0 | Status: SHIPPED | OUTPATIENT
Start: 2022-11-22

## 2022-11-22 NOTE — TELEPHONE ENCOUNTER
Pt is going out of town and needs refill to be addressed - he is due for an OV for further refills     See note below about making an appointment ( has not scheduled one yet)     Thank you     Rita Gilmore RN, BSN  [facility]

## 2022-11-22 NOTE — TELEPHONE ENCOUNTER
Call received from patient regarding this refill request. Prescription was refused due to needs appointment. States he is leaving for [place] today and will be back 12/21/22 and will be going back to [place] on 12/26/22. Patient is asking for an appointment while he is home on 12/22 (preferably), 12/23 or 12/26 in the morning. Please advise if this request can be accommodated and if refill can be sent. Patient will return to [place] again at the end of April.

## 2022-11-23 NOTE — TELEPHONE ENCOUNTER
Limited rx done, past due for cpx fasting, please arrange for 12/22    BP Readings from Last 3 Encounters:   11/15/21 136/80   04/14/20 137/82   03/13/20 (!) 156/94     This is not a daily med.

## 2022-12-08 NOTE — PROGRESS NOTES
[facility] - [place]    Telephone visit  - [phone]    Subjective    Yannick Jean is a 59 year old male who is being evaluated via a billable telephone visit.      Chief Complaint   Patient presents with     Recheck Medication     Doing well - work from home - [employer] - stress echo on hold secondary COVID 19    Still smoking and drinking (decreasing)    Grieving better, bizarre dreams have stopped, wife's birthday went well    Medication Followup of Sildenafil    Taking Medication as prescribed: yes    Side Effects:  None    Medication Helping Symptoms:  Yes    Notes helpful, with GoodRx $14, notes needs 100 mg total     -Did check his blood pressure since he knows he can't come into the clinic. Last week at work it was 134/85 and heart rate was 72, checked it at home today at it was 137/82 - pulse 70.    -They did recently cancel his stress test and they will call him back in about 6 weeks.     3/13/20    Patient recently lost wife - cerebellar ataxia - having trouble sleeping - grieving - reliving decisions     Smoking and drinking again, was sober x 27 yrs - not ready yet to quit, will reconsider chantix, smoking 1 ppd, 10 drinks daily     PHQ = 9 and SADIE = 2     Type 2 DM/Prediabetes           Lab Results   Component Value Date     A1C 6.5 01/18/2019     A1C 6.1 08/17/2017     A1C 6.1 05/24/2017      Lipids         Recent Labs   Lab Test 01/18/19  1227   CHOL 179   HDL 40   *   TRIG 169*      Htn         BP Readings from Last 3 Encounters:   03/13/20 (!) 156/94   04/10/19 120/78   03/27/19 94/78           Creatinine   Date Value Ref Range Status   03/13/2020 0.87 0.66 - 1.25 mg/dL Final     PMH    Past Medical History:   Diagnosis Date     Bell's palsy 08/2017     Cervical stenosis of spine 5/15    central and foraminal - moderate to severe     CKD (chronic kidney disease) stage 2, GFR 60-89 ml/min 05/2017     Colon polyps, hyperplastic 6/09    x 4 - due 2019     Diabetes  The skin at the left upper chest was closed with dermal adhesive. mellitus, type 2 (H) 2019     ED (erectile dysfunction)     dr atkinson     Hyperlipidemia LDL goal <100 2018     Hypertension goal BP (blood pressure) < 140/90      Mixed hearing loss     Dr Pretty - [facility]     Prediabetes 2017     Tobacco use disorder     quit        PSH    Past Surgical History:   Procedure Laterality Date     COLONOSCOPY      hyperplastic - due 10 yrs     FUSION CERVICAL ANTERIOR THREE+ LEVELS N/A 2015    C3-6 FUSION CERVICAL ANTERIOR - Dr Morris     PE TUBES      PE tubes x 5     STRESS ECHO ([facility])      normal     SURGICAL HISTORY OF -       Rt Ear cholesteoma/tympanoplasty dr pretty     SURGICAL HISTORY OF -       dr patton - large lt foot ganglion cyst     TONSILLECTOMY & ADENOIDECTOMY  ,       Medications    Current Outpatient Medications   Medication Sig Dispense Refill     sildenafil (REVATIO) 20 MG tablet Take 1-5 tablets ( mg) by mouth daily as needed (ED) 50 tablet 3       Allergies    Penicillins    Family History    Family History   Problem Relation Age of Onset     Cardiovascular Father         First MI at 61, dies of MI at age 65     Chronic Obstructive Pulmonary Disease Mother      Macular Degeneration Mother      Breast Cancer Maternal Grandmother      Coronary Artery Disease Paternal Grandmother          at 50     Cerebrovascular Disease Paternal Grandfather        Social History    Social History     Socioeconomic History     Marital status:      Spouse name: aguila     Number of children: 2     Years of education: 12     Highest education level: Not on file   Occupational History     Employer: NONE    Social Needs     Financial resource strain: Not on file     Food insecurity     Worry: Not on file     Inability: Not on file     Transportation needs     Medical: Not on file     Non-medical: Not on file   Tobacco Use     Smoking status: Current Every Day Smoker     Packs/day: 2.00     Years: 32.00     Pack years:  64.00     Types: Cigarettes     Last attempt to quit: 2009     Years since quittin.2     Smokeless tobacco: Never Used   Substance and Sexual Activity     Alcohol use: Yes     Comment: 10 per week     Drug use: No     Sexual activity: Yes     Partners: Female   Lifestyle     Physical activity     Days per week: Not on file     Minutes per session: Not on file     Stress: Not on file   Relationships     Social connections     Talks on phone: Not on file     Gets together: Not on file     Attends [employer] service: Not on file     Active member of club or organization: Not on file     Attends meetings of clubs or organizations: Not on file     Relationship status: Not on file     Intimate partner violence     Fear of current or ex partner: Not on file     Emotionally abused: Not on file     Physically abused: Not on file     Forced sexual activity: Not on file   Other Topics Concern      Service Not Asked     Blood Transfusions Not Asked     Caffeine Concern Yes     Comment: 1-2 cups qd, 5 cans daily     Occupational Exposure Not Asked     Hobby Hazards Not Asked     Sleep Concern Not Asked     Stress Concern Not Asked     Weight Concern Not Asked     Special Diet Not Asked     Back Care Not Asked     Exercise No     Bike Helmet Not Asked     Seat Belt No     Self-Exams Not Asked     Parent/sibling w/ CABG, MI or angioplasty before 65F 55M? Not Asked   Social History Narrative     Not on file       Reviewed and updated as needed this visit by Provider           Review of Systems     ROS COMP: CONSTITUTIONAL: NEGATIVE for fever, chills, change in weight  INTEGUMENTARY/SKIN: NEGATIVE for worrisome rashes, moles or lesions  EYES: NEGATIVE for vision changes or irritation  ENT/MOUTH: NEGATIVE for ear, mouth and throat problems  RESP: NEGATIVE for significant cough or SOB  CV: NEGATIVE for chest pain, palpitations or peripheral edema  GI: NEGATIVE for nausea, abdominal pain, heartburn, or change in bowel  habits  : NEGATIVE for frequency, dysuria, or hematuria  MUSCULOSKELETAL: NEGATIVE for significant arthralgias or myalgia  NEURO: NEGATIVE for weakness, dizziness or paresthesias  ENDOCRINE: NEGATIVE for temperature intolerance, skin/hair changes  HEME: NEGATIVE for bleeding problems  PSYCHIATRIC: NEGATIVE for changes in mood or affect    Objective    Reported vitals:  There were no vitals taken for this visit.     healthy, alert and no distress  Psych: Alert and oriented times 3; coherent speech, normal   rate and volume, able to articulate logical thoughts, able   to abstract reason, no tangential thoughts, no hallucinations   or delusions  His affect is normal     Diagnostic Test Results:  Fasting labs pending    Assessment/Plan:      ICD-10-CM    1. Grieving  F43.21 TSH with free T4 reflex   2. Type 2 diabetes mellitus without complication, without long-term current use of insulin (H)  E11.9 Comprehensive metabolic panel     UA reflex to Microscopic and Culture     Albumin Random Urine Quantitative with Creat Ratio     **A1C FUTURE anytime   3. Prediabetes  R73.03 Comprehensive metabolic panel     UA reflex to Microscopic and Culture     Albumin Random Urine Quantitative with Creat Ratio     **A1C FUTURE anytime   4. CKD (chronic kidney disease) stage 2, GFR 60-89 ml/min  N18.2 Comprehensive metabolic panel     CBC with platelets     UA reflex to Microscopic and Culture     Albumin Random Urine Quantitative with Creat Ratio   5. Hypertension goal BP (blood pressure) < 140/90  I10 Comprehensive metabolic panel     UA reflex to Microscopic and Culture     Albumin Random Urine Quantitative with Creat Ratio   6. Hyperlipidemia LDL goal <100  E78.5 Comprehensive metabolic panel     Lipid panel reflex to direct LDL Fasting     CK total   7. Erectile dysfunction, unspecified erectile dysfunction type  N52.9 sildenafil (REVATIO) 20 MG tablet   8. Screening for prostate cancer  Z12.5 Prostate spec antigen screen   9.  "Screen for colon cancer  Z12.11 Fecal colorectal cancer screen FIT   10. Tobacco use disorder  F17.200    11. Medication monitoring encounter  Z51.81 Comprehensive metabolic panel     Lipid panel reflex to direct LDL Fasting     CK total     CBC with platelets     TSH with free T4 reflex     UA reflex to Microscopic and Culture     Albumin Random Urine Quantitative with Creat Ratio     **A1C FUTURE anytime   12. Encounter for routine adult health examination without abnormal findings  Z00.00 Comprehensive metabolic panel     Lipid panel reflex to direct LDL Fasting     CK total     CBC with platelets     TSH with free T4 reflex     UA reflex to Microscopic and Culture     Albumin Random Urine Quantitative with Creat Ratio     Prostate spec antigen screen     Fecal colorectal cancer screen FIT     **A1C FUTURE anytime       Reviewed grieving  Stop smoking  Stop drinking  Watch hair loss, consider derm  Stress echo when able  Med refill  Watch BP, low salt, watch weight  Stress echo when able  CPX/labs when able    Return if symptoms worsen or fail to improve, for Medication Recheck Visit, Follow Up Chronic.    Phone call duration:  17 minutes    The patient has been notified of following:     \"This telephone visit will be conducted via a call between you and your physician/provider. We have found that certain health care needs can be provided without the need for a physical exam.  This service lets us provide the care you need with a short phone conversation.  If a prescription is necessary we can send it directly to your pharmacy.  If lab work is needed we can place an order for that and you can then stop by our lab to have the test done at a later time.    Telephone visits are billed at different rates depending on your insurance coverage. During this emergency period, for some insurers they may be billed the same as an in-person visit.  Please reach out to your insurance provider with any questions.    If during " "the course of the call the physician/provider feels a telephone visit is not appropriate, you will not be charged for this service.\"    Patient has given verbal consent for Telephone visit?  Yes    How would you like to obtain your AVS? Freda Back MD, FAAFP     [facility]  [address] [place] [id number]  [email or web address]   Office: [phone]  Fax: [phone]  Pager: [phone]       "

## 2024-06-17 PROBLEM — Z71.89 ADVANCED DIRECTIVES, COUNSELING/DISCUSSION: Status: RESOLVED | Noted: 2017-06-21 | Resolved: 2024-06-17

## 2024-08-29 NOTE — TELEPHONE ENCOUNTER
Medication is being filled for 1 time refill only due to:  Patient needs to be seen because due for px soon.    Brittnee Aguirre RN  [facility]  
Awake